# Patient Record
Sex: MALE | Race: WHITE | NOT HISPANIC OR LATINO | Employment: OTHER | URBAN - NONMETROPOLITAN AREA
[De-identification: names, ages, dates, MRNs, and addresses within clinical notes are randomized per-mention and may not be internally consistent; named-entity substitution may affect disease eponyms.]

---

## 2017-03-28 RX ORDER — CARVEDILOL 12.5 MG/1
TABLET ORAL
Qty: 60 TABLET | Refills: 4 | Status: SHIPPED | OUTPATIENT
Start: 2017-03-28 | End: 2017-08-28 | Stop reason: SDUPTHER

## 2017-05-24 RX ORDER — AMLODIPINE BESYLATE 5 MG/1
TABLET ORAL
Qty: 90 TABLET | Refills: 3 | OUTPATIENT
Start: 2017-05-24

## 2017-05-26 RX ORDER — AMLODIPINE BESYLATE 5 MG/1
TABLET ORAL
Qty: 90 TABLET | Refills: 3 | OUTPATIENT
Start: 2017-05-26

## 2017-05-31 RX ORDER — AMLODIPINE BESYLATE 5 MG/1
5 TABLET ORAL DAILY
Qty: 30 TABLET | Refills: 0 | Status: SHIPPED | OUTPATIENT
Start: 2017-05-31 | End: 2017-07-15 | Stop reason: SDUPTHER

## 2017-06-20 RX ORDER — LISINOPRIL AND HYDROCHLOROTHIAZIDE 20; 12.5 MG/1; MG/1
TABLET ORAL
Qty: 180 TABLET | Refills: 3 | Status: SHIPPED | OUTPATIENT
Start: 2017-06-20

## 2017-07-17 RX ORDER — AMLODIPINE BESYLATE 5 MG/1
TABLET ORAL
Qty: 30 TABLET | Refills: 0 | Status: SHIPPED | OUTPATIENT
Start: 2017-07-17 | End: 2017-08-22 | Stop reason: SDUPTHER

## 2017-08-23 RX ORDER — AMLODIPINE BESYLATE 5 MG/1
TABLET ORAL
Qty: 30 TABLET | Refills: 0 | Status: SHIPPED | OUTPATIENT
Start: 2017-08-23 | End: 2017-08-28 | Stop reason: SDUPTHER

## 2017-08-28 ENCOUNTER — OFFICE VISIT (OUTPATIENT)
Dept: FAMILY MEDICINE CLINIC | Facility: CLINIC | Age: 67
End: 2017-08-28

## 2017-08-28 VITALS
WEIGHT: 234 LBS | BODY MASS INDEX: 31.69 KG/M2 | SYSTOLIC BLOOD PRESSURE: 130 MMHG | OXYGEN SATURATION: 97 % | HEART RATE: 54 BPM | HEIGHT: 72 IN | DIASTOLIC BLOOD PRESSURE: 80 MMHG

## 2017-08-28 DIAGNOSIS — I10 ESSENTIAL HYPERTENSION: Primary | ICD-10-CM

## 2017-08-28 DIAGNOSIS — E78.5 HYPERLIPIDEMIA, UNSPECIFIED HYPERLIPIDEMIA TYPE: ICD-10-CM

## 2017-08-28 DIAGNOSIS — S29.9XXS CHEST WALL TRAUMA, SEQUELA: ICD-10-CM

## 2017-08-28 DIAGNOSIS — E11.9 TYPE 2 DIABETES MELLITUS WITHOUT COMPLICATION, WITHOUT LONG-TERM CURRENT USE OF INSULIN (HCC): ICD-10-CM

## 2017-08-28 LAB — HBA1C MFR BLD: 6.3 %

## 2017-08-28 PROCEDURE — 83036 HEMOGLOBIN GLYCOSYLATED A1C: CPT | Performed by: FAMILY MEDICINE

## 2017-08-28 PROCEDURE — 99214 OFFICE O/P EST MOD 30 MIN: CPT | Performed by: FAMILY MEDICINE

## 2017-08-28 RX ORDER — CARVEDILOL 12.5 MG/1
12.5 TABLET ORAL 2 TIMES DAILY WITH MEALS
Qty: 60 TABLET | Refills: 12 | Status: SHIPPED | OUTPATIENT
Start: 2017-08-28

## 2017-08-28 RX ORDER — AMLODIPINE BESYLATE 5 MG/1
5 TABLET ORAL DAILY
Qty: 30 TABLET | Refills: 12 | Status: SHIPPED | OUTPATIENT
Start: 2017-08-28

## 2017-08-28 NOTE — PROGRESS NOTES
Subjective   Ace Meng is a 66 y.o. male.     History of Present Illness   66-year-old male.  History of hyperlipidemia, hypertension, diabetes.    Regarding diabetes.  Through diet exercise and weight loss he was able to lower his A1c into a pre-diabetes level.  He has not always been compliant with follow-ups.  Is here to check another A1c.    Regarding hypertension, medicines are reviewed.  He needs some renewals.    Diarrhea.  In the last year has seen Dr. Nicole.  He's had a colonoscopy which apparently showed some microscopic colitis.  He's had 2 EGDs.  EGDs are on the chart but not the colonoscopy.  Has been taking Lomotil daily but still has some trouble with loose stool.    In June, trauma and ended up in the emergency room.  He suspected broken ribs but the workup did not reveal this.  It did reveal some fatty liver and he has some questions about that.  The following portions of the patient's history were reviewed and updated as appropriate: allergies, current medications, past family history, past medical history, past social history, past surgical history and problem list.    Review of Systems   Constitutional: Negative for fever and unexpected weight change.   HENT: Negative for congestion.    Respiratory: Negative for cough and shortness of breath.    Gastrointestinal: Positive for diarrhea. Negative for blood in stool.       Objective   Physical Exam   Constitutional: He is oriented to person, place, and time. He appears well-nourished.   HENT:   Head: Normocephalic.   Right Ear: External ear normal.   Left Ear: External ear normal.   Eyes: EOM are normal.   Cardiovascular: Normal rate and regular rhythm.    Pulmonary/Chest: Effort normal and breath sounds normal.   Neurological: He is alert and oriented to person, place, and time.   Psychiatric: He has a normal mood and affect.       Assessment/Plan   Diagnoses and all orders for this visit:    Essential hypertension  -     amLODIPine  (NORVASC) 5 MG tablet; Take 1 tablet by mouth Daily.  -     carvedilol (COREG) 12.5 MG tablet; Take 1 tablet by mouth 2 (Two) Times a Day With Meals.    Type 2 diabetes mellitus without complication, without long-term current use of insulin  -     POC Glycosylated Hemoglobin (Hb A1C)    Hyperlipidemia, unspecified hyperlipidemia type    Chest wall trauma, sequela    Hypertension.  Refills given.  Pressure looks fine today.  I noticed that his heart rate was a little slow but apparently it hasn't been at home.  He will monitor his heart rate.  Can certainly reduce the dose of Coreg in the future.    Diabetes.  A1c is 6.3.  In the past, he was on problems statin but really stopped because he doesn't want to be on it.  He does take an ACE inhibitor.  After general discussion, no other meds for this.  I strongly encouraged him to come in for a Medicare wellness visit or we can look at things more extensively.    History of hyperlipidemia.  Needs to be reevaluated    Chest wall trauma.  I have reviewed records from June where he was in the emergency room.  Extensive workup.  This is now resolved.  We discussed the reports that said he had fatty liver and what it meant.  Advised him to continue diet exercise keep his weight down.  A granuloma that appears benign was also seen.

## 2018-07-11 ENCOUNTER — TRANSCRIBE ORDERS (OUTPATIENT)
Dept: PAIN MEDICINE | Facility: CLINIC | Age: 68
End: 2018-07-11

## 2018-07-11 DIAGNOSIS — M54.41 ACUTE LEFT-SIDED LOW BACK PAIN WITH RIGHT-SIDED SCIATICA: Primary | ICD-10-CM

## 2018-07-30 ENCOUNTER — TELEPHONE (OUTPATIENT)
Dept: PAIN MEDICINE | Facility: CLINIC | Age: 68
End: 2018-07-30

## 2018-07-30 NOTE — TELEPHONE ENCOUNTER
Brian Report #[id number]   MRI of the lumbar spine 07/06/2018, revealed conus ends at T12. Diffuse degenerative changes.  Disc levels:  L1-L2: small disc bulge without canal or NF stenosis  L2-L3: small disc bulge with mild articular facet disease. Mild NF stenosis. Central canal is patent  L3-L4: small disc bulge with mild articular facet disease. Moderate to severe left and moderate right NF stenosis. Mild central canal stenosis  L4-L5: Moderate articular facet disease with mild ligamentum flavum hypertrophy. Fluid in the left facet joint. Small disc protrusion. Mild to moderate central canal stenosis. Moderate left NF stenosis  L5-S1: Small disc bulge. Moderate articular facet disease. Central canal is patent. Mild to moderate NF stenosis.

## 2018-07-30 NOTE — PROGRESS NOTES
"Chief Complaint: \"Pain in my lower back and left leg.\"     History of Present Illness:   Patient: Mr. Ace Meng, 67 y.o. male   Referring physician: Dr. Warner Swann   Reason for referral: Consultation for intractable chronic low back and left leg pain.   Pain history: Patient reports a 50-year history of lower back pain, which began without incident. Pain has progressed in intensity over the past 1 month.   Pain description: constant lower back pain with intermittent exacerbation, described as aching and throbbing sensation.   Radiation of pain: The lower back pain radiates into the posterior aspect of the left hip and anterior aspect of the left thigh.  Pain used to radiate all the way down to the dorsum of his left foot.  Pain has centralized after starting physical therapy  Pain intensity today: 2/10  Average pain intensity last week: 5/10  Pain intensity ranges from: 2-3/10 to 10/10  Aggravating factors: Pain increases with lying down, standing for a few minutes and walking more than 100 yards  Alleviating factors: Pain decreases with sitting down and changing positions    Associated symptoms:   Patient reports pain numbness and weakness in the left lower extremity.  Patient denies right-sided symptoms.  Patient denies any new bladder or bowel problems.   Patient denies difficulties with his balance or recent falls.      Review of previous therapies and additional medical records:  Ace Meng has already failed the following measures, including:   Conservative measures: analgesics, chiropractic therapy, physical therapy   Interventional measures: None  Surgical measures: No history of lumbar spine surgery   Ace Meng presents with significant comorbidities, particularly diabetes type 2, HTN, CAD.   In terms of current analgesics, Ace Meng takes: gabapentin and trazodone  I have reviewed Brian Report #[id number] consistent to medication reconciliation.     Global Pain Scale  07-31-18    "              Pain  9                 Feelings  3                 Clinical outcomes  18                 Activities 14                 GPS Total:  44                    Review of Diagnostic Studies:    MRI of the lumbar spine 07/06/2018, revealed conus ends at T12. Diffuse degenerative changes.  Disc levels:  L1-L2: small disc bulge without canal or NF stenosis  L2-L3: small disc bulge with mild articular facet disease. Mild NF stenosis. Central canal is patent  L3-L4: small disc bulge with mild articular facet disease. Moderate to severe left and moderate right NF stenosis. Mild central canal stenosis  L4-L5: Moderate articular facet disease with mild ligamentum flavum hypertrophy. Fluid in the left facet joint. Small disc protrusion. Mild to moderate central canal stenosis. Moderate left NF stenosis  L5-S1: Small disc bulge. Moderate articular facet disease. Central canal is patent. Mild to moderate NF stenosis.    Review of Systems   Musculoskeletal: Positive for arthralgias, back pain, gait problem, joint swelling and myalgias.   All other systems reviewed and are negative.        Patient Active Problem List   Diagnosis   • Hypertension   • Hyperlipidemia   • Diarrhea   • Diabetes mellitus (CMS/HCC)   • Colitis   • Acute coronary syndrome (CMS/HCC)   • Mild obesity   • Neuroforaminal stenosis of lumbar spine   • DDD (degenerative disc disease), lumbar       Past Medical History:   Diagnosis Date   • Abdominal pain, generalized    • Acute coronary syndrome (CMS/HCC)    • Allergic rhinitis    • Chronic low back pain    • Colitis    • Diabetes mellitus (CMS/HCC)    • Diarrhea    • Family history of colon cancer    • H/O colonoscopy 04/05/2016    Microscopic colitis   • Hyperlipidemia    • Hypertension          Past Surgical History:   Procedure Laterality Date   • COLONOSCOPY  07/2016    Dx with colitis         Family History   Problem Relation Age of Onset   • Cancer Father         colon         Social History  "    Social History   • Marital status:      Social History Main Topics   • Smoking status: Never Smoker   • Drug use: Unknown     Other Topics Concern   • Not on file        Current Outpatient Prescriptions:   •  amLODIPine (NORVASC) 5 MG tablet, Take 1 tablet by mouth Daily., Disp: 30 tablet, Rfl: 12  •  carvedilol (COREG) 12.5 MG tablet, Take 1 tablet by mouth 2 (Two) Times a Day With Meals., Disp: 60 tablet, Rfl: 12  •  cholecalciferol (VITAMIN D3) 400 units tablet, Take 400 Units by mouth Daily., Disp: , Rfl:   •  diphenoxylate-atropine (LOMOTIL) 2.5-0.025 MG per tablet, Take 1 tablet by mouth 4 (Four) Times a Day As Needed for Diarrhea., Disp: , Rfl:   •  gabapentin (NEURONTIN) 100 MG capsule, Take 100 mg by mouth 3 (Three) Times a Day., Disp: , Rfl:   •  glimepiride (AMARYL) 1 MG tablet, Take 1 mg by mouth Every Morning Before Breakfast., Disp: , Rfl:   •  glucose blood (FREESTYLE LITE) test strip, 1 each by Other route 2 (Two) Times a Week. Use as instructed, Disp: 100 each, Rfl: 0  •  lisinopril-hydrochlorothiazide (PRINZIDE,ZESTORETIC) 20-12.5 MG per tablet, TAKE ONE TABLET BY MOUTH TWICE A DAY (Patient taking differently: TAKE ONE TABLET BY MOUTH daily), Disp: 180 tablet, Rfl: 3  •  OMEGA-3 FATTY ACIDS PO, Take  by mouth., Disp: , Rfl:   •  traZODone (DESYREL) 50 MG tablet, Take 50 mg by mouth Every Night., Disp: , Rfl:       No Known Allergies      /62   Pulse 62   Temp 97.6 °F (36.4 °C) (Temporal Artery )   Resp 18   Ht 182.9 cm (72\")   Wt 107 kg (236 lb 12.8 oz)   SpO2 97%   BMI 32.12 kg/m²       Physical Exam:  Constitutional: Patient is oriented to person, place, and time. Vital signs are normal. Patient appears well-developed and well-nourished.   HEENT: Head: Normocephalic and atraumatic. Eyes: Conjunctivae and lids are normal. Pupils: Equal, round, reactive to light.   Neck: Trachea normal. Neck supple. No JVD present.   Pulmonary Respiratory effort: No increased work of " breathing or signs of respiratory distress. Auscultation of lungs: Clear to auscultation.   Cardiovascular Auscultation of heart: Normal rate and rhythm, normal S1 and S2, no murmurs.   Peripheral vascular exam: Femoral: right 2+, left 2+.  Abdomen: The abdomen was soft and nontender. Bowel sounds were normal.   Musculoskeletal   Gait and station: Gait evaluation demonstrated a normal gait   Lumbar spine: The range of motion of the lumbar spine is full and without pain. Extension, flexion, lateral flexion, rotation of the lumbar spine did not increase or reproduce pain. Lumbar facet joint loading maneuvers are negative.   Jason and Gaenslen's tests are negative   Piriformis maneuvers are negative   Palpation of the bilateral ischial tuberosities, unrevealing   Palpation of the bilateral greater trochanter, unrevealing   Examination of the Iliotibial band: unrevealing   Hip joints: The range of motion of the hip joints is full and without pain   Neurological: Patient is alert and oriented to person, place, and time. Speech: speech is normal. Cortical function: Normal mental status.   Cranial nerves: Cranial nerves 2-12 intact.   Reflex Scores:  Right patellar: 1+  Left patellar: 1+  Right achilles: 0+  Left achilles: 0+  Motor strength: 5/5  Motor Tone: normal tone.   Involuntary movements: none.   Superficial/Primitive Reflexes: primitive reflexes were absent.   Right Dutta: absent  Left Dutta: absent  Right ankle clonus: absent  Left ankle clonus: absent   Negative long tract signs. Straight leg raising test is negative. Femoral stretch sign is negative.   Sensation: No sensory loss. Sensory exam: intact to light touch, intact pain and temperature sensation, intact vibration sensation and normal proprioception.   Coordination: Normal finger to nose and heel to shin. Normal balance and negative. Romberg's sign negative.   Skin and subcutaneous tissue: Skin is warm and intact. No rash noted. No cyanosis.    Psychiatric: Judgment and insight: Normal. Orientation to person, place and time: Normal. Recent and remote memory: Intact. Mood and affect: Normal.     ASSESSMENT:   1. Neuroforaminal stenosis of lumbar spine    2. DDD (degenerative disc disease), lumbar    3. Type 2 diabetes mellitus without complication, without long-term current use of insulin (CMS/[facility])    4. Mild obesity      PLAN/MEDICAL DECISION MAKING: I had a lengthy conversation with . Ace Meng regarding his chronic pain condition and potential therapeutic options including risks, benefits, alternative therapies, to name a few. Patient Presents with a long-standing history of lower back pain, which caused significantly worse a few months back.  At that point, patient started experiencing severe left lower back pain radiating onto the left lower extremity all the way down onto his left foot.  After starting physical therapy, pain has centralized. MRI of the lumbar spine 07/06/2018, revealed diffuse degenerative disc and facet joint changes.  At L3-L4: disc bulge with mild articular facet disease. Moderate to severe left and moderate right NF stenosis. Mild central canal stenosis. L4-L5: Moderate articular facet disease with mild ligamentum flavum hypertrophy. Fluid in the left facet joint. Small disc protrusion. Mild to moderate central canal stenosis. Moderate left NF stenosis. At L5-S1: Small disc bulge. Moderate articular facet disease. Central canal is patent. Mild to moderate NF stenosis. Patient has failed to obtain pain relief with conservative measures, as referenced above. I have reviewed all available patient's medical records as well as previous therapies as referenced above. Therefore, I have proposed the following plan:  1. Pharmacological measures: Reviewed. Discussed. Patient takes gabapentin and trazodone  2. Interventional pain management measures: Patient will be scheduled for diagnostic and therapeutic left L3-L4 and left L4-L5  transforaminal epidural steroid injections. We may repeat another epidural depending on patient's outcome. Otherwise, we will obtain CT myelogram and facilitate neurosurgical consultation  3. Long-term rehabilitation efforts:  A. The patient does not have a history of falls. I did complete a risk assessment for falls.   B. Patient will start a comprehensive physical therapy program for water therapy, core strengthening, gait and balance training, neurodynamics, myofascial release, cupping and dry needling   C. Start an exercise program such as water therapy and daily walks for fitness  D. Contrast therapy: Apply ice-packs for 15-20 minutes, followed by heating pads for 15-20 minutes to affected area   4. The patient and his family have been instructed to contact my office with any questions or difficulties. The patient understands the plan and agrees to proceed accordingly.       Patient Care Team:  Warner Swann MD as PCP - General  Cal Nicole MD as PCP - Claims Attributed  Cal Nicole MD as Consulting Physician (Gastroenterology)  Anthony Vaughn MD as Consulting Physician (Cardiology)  Zachary Vilchis MD as Consulting Physician (Pain Medicine)  Gumaro Landers MD as Consulting Physician (Dermatology)  ALEJANDRINA Gomez as Nurse Practitioner (Internal Medicine)  ALLI Bowling DPM as Consulting Physician (Podiatry)     No orders of the defined types were placed in this encounter.        No future appointments.      Zachary Vilchis MD     EMR Dragon/Transcription disclaimer:  Much of this encounter note is an electronic transcription of spoken language to printed text. Electronic transcription of spoken language may permit erroneous, or at times, nonsensical words or phrases to be inadvertently transcribed. Although I have reviewed the note for such errors, some may still exist.

## 2018-07-31 ENCOUNTER — OFFICE VISIT (OUTPATIENT)
Dept: PAIN MEDICINE | Facility: CLINIC | Age: 68
End: 2018-07-31

## 2018-07-31 VITALS
WEIGHT: 236.8 LBS | BODY MASS INDEX: 32.07 KG/M2 | SYSTOLIC BLOOD PRESSURE: 122 MMHG | HEIGHT: 72 IN | HEART RATE: 62 BPM | OXYGEN SATURATION: 97 % | DIASTOLIC BLOOD PRESSURE: 62 MMHG | TEMPERATURE: 97.6 F | RESPIRATION RATE: 18 BRPM

## 2018-07-31 DIAGNOSIS — M51.36 DDD (DEGENERATIVE DISC DISEASE), LUMBAR: ICD-10-CM

## 2018-07-31 DIAGNOSIS — E66.9 MILD OBESITY: ICD-10-CM

## 2018-07-31 DIAGNOSIS — M48.061 NEUROFORAMINAL STENOSIS OF LUMBAR SPINE: ICD-10-CM

## 2018-07-31 DIAGNOSIS — E11.9 TYPE 2 DIABETES MELLITUS WITHOUT COMPLICATION, WITHOUT LONG-TERM CURRENT USE OF INSULIN (HCC): ICD-10-CM

## 2018-07-31 PROBLEM — M51.369 DDD (DEGENERATIVE DISC DISEASE), LUMBAR: Status: ACTIVE | Noted: 2018-07-31

## 2018-07-31 PROCEDURE — 99203 OFFICE O/P NEW LOW 30 MIN: CPT | Performed by: ANESTHESIOLOGY

## 2018-07-31 RX ORDER — OMEGA-3S/DHA/EPA/FISH OIL/D3 300MG-1000
400 CAPSULE ORAL DAILY
COMMUNITY
End: 2019-08-29

## 2018-07-31 RX ORDER — DIPHENOXYLATE HYDROCHLORIDE AND ATROPINE SULFATE 2.5; .025 MG/1; MG/1
1 TABLET ORAL 4 TIMES DAILY PRN
COMMUNITY
End: 2019-12-05

## 2018-07-31 RX ORDER — GLIMEPIRIDE 1 MG/1
1 TABLET ORAL
COMMUNITY

## 2018-07-31 RX ORDER — TRAZODONE HYDROCHLORIDE 50 MG/1
50 TABLET ORAL NIGHTLY
COMMUNITY
End: 2019-08-29

## 2018-07-31 RX ORDER — GABAPENTIN 100 MG/1
100 CAPSULE ORAL 3 TIMES DAILY
COMMUNITY
End: 2018-08-06 | Stop reason: SDUPTHER

## 2018-08-01 ENCOUNTER — OUTSIDE FACILITY SERVICE (OUTPATIENT)
Dept: PAIN MEDICINE | Facility: CLINIC | Age: 68
End: 2018-08-01

## 2018-08-01 PROCEDURE — 64483 NJX AA&/STRD TFRM EPI L/S 1: CPT | Performed by: ANESTHESIOLOGY

## 2018-08-01 PROCEDURE — 64484 NJX AA&/STRD TFRM EPI L/S EA: CPT | Performed by: ANESTHESIOLOGY

## 2018-08-01 PROCEDURE — 99152 MOD SED SAME PHYS/QHP 5/>YRS: CPT | Performed by: ANESTHESIOLOGY

## 2018-08-02 ENCOUNTER — TELEPHONE (OUTPATIENT)
Dept: PAIN MEDICINE | Facility: CLINIC | Age: 68
End: 2018-08-02

## 2018-08-02 NOTE — TELEPHONE ENCOUNTER
Spoke with Physical Therapy, Alan. Patient has been making progress and they wanted to verify that we did not have any new findings after patient procedure. Went over MRI results and advised that patient had TESI yesterday. No further needs expressed.

## 2018-08-02 NOTE — TELEPHONE ENCOUNTER
Patient had dx/tx left L3-L4 and left L4-L5 TFESI. Patient reports he is doing great. No pain and no soreness

## 2018-08-06 RX ORDER — GABAPENTIN 100 MG/1
CAPSULE ORAL
Qty: 180 CAPSULE | Refills: 5 | OUTPATIENT
Start: 2018-08-06 | End: 2019-08-29

## 2018-08-06 NOTE — TELEPHONE ENCOUNTER
Patient called and stated that when he had injection last week he had 100% relief for a couple of days. Some of the pain, mainly in the legs has returned since but not as severe as before. The pain is worse at night and in the mornings and he is having difficulty sleeping. He was taking Gabapentin 100 mg tid by Dr. King but then they increased to 200 mg tid. He is currently out of the Gabapentin and wanted to know if you would put him back on it or is there anything else he can do? He is doing physical therapy as well.     Per Dr. Vilchis: Ok to refill 200 TID, or if he tolerated 200 ok, we can give him 300 mg TID     Spoke with patient and he would like to do 200 mg tid    Rx called into [facility]

## 2019-08-28 ENCOUNTER — TELEPHONE (OUTPATIENT)
Dept: GASTROENTEROLOGY | Facility: CLINIC | Age: 69
End: 2019-08-28

## 2019-08-29 ENCOUNTER — OFFICE VISIT (OUTPATIENT)
Dept: GASTROENTEROLOGY | Facility: CLINIC | Age: 69
End: 2019-08-29

## 2019-08-29 VITALS
HEIGHT: 72 IN | TEMPERATURE: 97.1 F | SYSTOLIC BLOOD PRESSURE: 130 MMHG | DIASTOLIC BLOOD PRESSURE: 70 MMHG | BODY MASS INDEX: 32.32 KG/M2 | WEIGHT: 238.6 LBS | OXYGEN SATURATION: 98 % | HEART RATE: 52 BPM

## 2019-08-29 DIAGNOSIS — R19.7 DIARRHEA, UNSPECIFIED TYPE: ICD-10-CM

## 2019-08-29 DIAGNOSIS — K58.0 IRRITABLE BOWEL SYNDROME WITH DIARRHEA: ICD-10-CM

## 2019-08-29 DIAGNOSIS — K52.839 MICROSCOPIC COLITIS, UNSPECIFIED MICROSCOPIC COLITIS TYPE: Primary | ICD-10-CM

## 2019-08-29 PROCEDURE — 99204 OFFICE O/P NEW MOD 45 MIN: CPT | Performed by: INTERNAL MEDICINE

## 2019-08-29 RX ORDER — ATORVASTATIN CALCIUM 20 MG/1
20 TABLET, FILM COATED ORAL DAILY
COMMUNITY

## 2019-08-29 NOTE — PROGRESS NOTES
PCP: Warner Swann MD    Chief Complaint   Patient presents with   • GI Problem     Colitis       History of Present Illness:   HPI   Mr. Meng is a 68-year-old with a history of acute coronary syndrome, diabetes and hyperlipidemia.  The patient over the last several years has been followed by Dr. Nicole in [place], [place].  He was diagnosed with microscopic colitis in 2016 and has been on medical management.  Mr. Meng has taken Entocort, Uceris, Imodium and Lialda.  The main issue with Entocort and Uceris was the cost of the medication.  There was improvement of the diarrhea while on medical therapy.  He denies any localized abdominal pain but can develop some constipation after taking Lomotil.  He states that in general there are no nocturnal episodes of diarrhea.  The patient denies any blood in the stool.  Mr. Meng does admit to some urgency to have a bowel movement at times after meals.  There is no history of unexplained weight loss.  He has a good appetite overall.  There is no history of nausea or vomiting.  Past Medical History:   Diagnosis Date   • Abdominal pain, generalized    • Acute coronary syndrome (CMS/HCC)    • Allergic rhinitis    • Chronic low back pain    • Colitis    • Diabetes mellitus (CMS/HCC)    • Diarrhea    • Family history of colon cancer    • H/O colonoscopy 04/05/2016    Microscopic colitis   • Hyperlipidemia    • Hypertension        Past Surgical History:   Procedure Laterality Date   • COLONOSCOPY  07/2016    Dx with colitis         Current Outpatient Medications:   •  amLODIPine (NORVASC) 5 MG tablet, Take 1 tablet by mouth Daily. (Patient taking differently: Take 10 mg by mouth Daily.), Disp: 30 tablet, Rfl: 12  •  atorvastatin (LIPITOR) 20 MG tablet, Take 20 mg by mouth Daily., Disp: , Rfl:   •  carvedilol (COREG) 12.5 MG tablet, Take 1 tablet by mouth 2 (Two) Times a Day With Meals. (Patient taking differently: Take 6.25 mg by mouth 2 (Two) Times a Day With  Meals.), Disp: 60 tablet, Rfl: 12  •  diphenoxylate-atropine (LOMOTIL) 2.5-0.025 MG per tablet, Take 1 tablet by mouth 4 (Four) Times a Day As Needed for Diarrhea., Disp: , Rfl:   •  glimepiride (AMARYL) 1 MG tablet, Take 1 mg by mouth Every Morning Before Breakfast., Disp: , Rfl:   •  glucose blood (FREESTYLE LITE) test strip, 1 each by Other route 2 (Two) Times a Week. Use as instructed, Disp: 100 each, Rfl: 0  •  lisinopril-hydrochlorothiazide (PRINZIDE,ZESTORETIC) 20-12.5 MG per tablet, TAKE ONE TABLET BY MOUTH TWICE A DAY (Patient taking differently: TAKE ONE TABLET BY MOUTH TWICE DAY), Disp: 180 tablet, Rfl: 3    No Known Allergies    Family History   Problem Relation Age of Onset   • Cancer Father         colon       Social History     Socioeconomic History   • Marital status:      Spouse name: Not on file   • Number of children: Not on file   • Years of education: Not on file   • Highest education level: Not on file   Tobacco Use   • Smoking status: Never Smoker       Review of Systems   Constitutional: Positive for fatigue and unexpected weight change (LOSS). Negative for activity change, appetite change and fever.   HENT: Negative for dental problem, hearing loss, mouth sores, postnasal drip, sneezing, trouble swallowing and voice change.    Eyes: Negative for pain, redness, itching and visual disturbance.   Respiratory: Negative for cough, choking, chest tightness, shortness of breath and wheezing.    Cardiovascular: Negative for chest pain, palpitations and leg swelling.   Gastrointestinal: Positive for abdominal distention, abdominal pain and diarrhea. Negative for anal bleeding, blood in stool, constipation, nausea, rectal pain and vomiting.   Endocrine: Negative for cold intolerance, heat intolerance, polydipsia, polyphagia and polyuria.   Genitourinary: Negative.  Negative for dysuria, enuresis, flank pain, hematuria and urgency.   Musculoskeletal: Negative for arthralgias, back pain, gait  problem, joint swelling and myalgias.   Skin: Negative for color change, pallor and rash.   Allergic/Immunologic: Negative for environmental allergies, food allergies and immunocompromised state.   Neurological: Negative for dizziness, tremors, seizures, facial asymmetry, speech difficulty, numbness and headaches.   Hematological: Negative for adenopathy.   Psychiatric/Behavioral: Negative for behavioral problems, confusion, dysphoric mood, hallucinations and self-injury.       Vitals:    08/29/19 0913   BP: 130/70   Pulse: 52   Temp: 97.1 °F (36.2 °C)   SpO2: 98%       Physical Exam   Constitutional: He is oriented to person, place, and time. He appears well-nourished. No distress.   HENT:   Head: Atraumatic.   Mouth/Throat: Oropharynx is clear and moist. No oropharyngeal exudate.   Eyes: EOM are normal. No scleral icterus.   Neck: Neck supple. No thyromegaly present.   Cardiovascular: Normal rate, regular rhythm and normal heart sounds. Exam reveals no gallop.   No murmur heard.  Pulmonary/Chest: Effort normal and breath sounds normal. He has no wheezes. He has no rales.   Abdominal: Soft. Bowel sounds are normal. There is no tenderness. There is no guarding.   Diastases recti   Musculoskeletal: Normal range of motion. He exhibits no edema or tenderness.   Lymphadenopathy:     He has no cervical adenopathy.   Neurological: He is alert and oriented to person, place, and time. He exhibits normal muscle tone.   Skin: Skin is dry. No erythema.   Psychiatric: He has a normal mood and affect. His behavior is normal. Thought content normal.   Vitals reviewed.      Ace was seen today for gi problem.    Diagnoses and all orders for this visit:    Microscopic colitis, unspecified microscopic colitis type    Diarrhea, unspecified type  -     rifaximin (XIFAXAN) 550 MG tablet; Take 1 tablet by mouth Every 8 (Eight) Hours.    Irritable bowel syndrome with diarrhea    The patient has biopsy-proven microscopic colitis upon  review of the records.  He had a small bowel biopsy that was negative for celiac disease.  There is an element of IBS diarrhea given the clinical history.  Overall the patient does well with Entocort but the major issue is the cost.      Plan: Will prescribe Xifaxan 550 mg 1 by mouth 3 times a day for 14 days.           Also discussed the use of Pepto-Bismol tablets 2 by mouth 3 times a day.  The patient was informed that Pepto-Bismol tablets can make the stool black.           Discussed stopping the Lomotil medication given the potential adverse effects.           Will follow-up in the office in 3 months.

## 2019-08-30 ENCOUNTER — TELEPHONE (OUTPATIENT)
Dept: GASTROENTEROLOGY | Facility: CLINIC | Age: 69
End: 2019-08-30

## 2019-08-30 NOTE — TELEPHONE ENCOUNTER
SPOKE WITH PATIENT'S WIFE. INFORMED HER THAT THE XIFAXAN PA HAS BEEN SENT THIS AFTERNOON. IT NORMALLY TAKE UP TO 48 -72 HOURS TO HEAR BACK FROM INSURANCE. WIFE VOICED UNDERSTANDING.

## 2019-09-03 ENCOUNTER — TELEPHONE (OUTPATIENT)
Dept: GASTROENTEROLOGY | Facility: CLINIC | Age: 69
End: 2019-09-03

## 2019-09-03 NOTE — TELEPHONE ENCOUNTER
Spouse called [facility] Friday 8/30/19 regarding medication that has a very high co-pay. Spouse would like a return call regarding an alternative medication, if available. Route to [facility] to consult with Dr. Jacome for review.

## 2019-09-04 NOTE — TELEPHONE ENCOUNTER
Called patient's wife back. No answer; left voice message. Xifaxan 550 mg tid x 14 days ready for .

## 2019-10-04 ENCOUNTER — TELEPHONE (OUTPATIENT)
Dept: GASTROENTEROLOGY | Facility: CLINIC | Age: 69
End: 2019-10-04

## 2019-10-04 NOTE — TELEPHONE ENCOUNTER
I talked to patient this morning. He stated he went a head and paid for the Xifaxan 550 mg TiD. He stated it seems to be working pretty good. He is still taking the Pepto Bismol. Thanks

## 2019-10-15 ENCOUNTER — TELEPHONE (OUTPATIENT)
Dept: GASTROENTEROLOGY | Facility: CLINIC | Age: 69
End: 2019-10-15

## 2019-10-15 NOTE — TELEPHONE ENCOUNTER
Spouse called in regards to BM issues and would like to know if it is possible to increase his pepto.; advised Clarissa I would consult with physician and return call as soon as possible. Spouse confirmed understanding.

## 2019-10-17 ENCOUNTER — TELEPHONE (OUTPATIENT)
Dept: GASTROENTEROLOGY | Facility: CLINIC | Age: 69
End: 2019-10-17

## 2019-10-17 NOTE — TELEPHONE ENCOUNTER
Spouse returned called; advised of Kasia response to the Kaopectate; advised dosage would be directions on back of bottle per Mario. Spouse voiced understanding.

## 2019-10-17 NOTE — TELEPHONE ENCOUNTER
Kasia,  Patient wife called. She stated that patient is having a lot of diarrhea; something incontinence. She wants to know if patient can take the Pepto with Kaopectate or the Kaopectate in place of the Pepto. Please advise. Dr Jacome's patient. Thanks

## 2019-10-17 NOTE — TELEPHONE ENCOUNTER
darrel  According to samanta's last note, pt can take Pepto-Bismol tablets 2 by mouth 3 times a day.  Let pt know that it can make the stool black.  She can substitute w/ Kaopectate

## 2019-12-05 ENCOUNTER — OFFICE VISIT (OUTPATIENT)
Dept: GASTROENTEROLOGY | Facility: CLINIC | Age: 69
End: 2019-12-05

## 2019-12-05 VITALS
HEIGHT: 72 IN | HEART RATE: 56 BPM | OXYGEN SATURATION: 97 % | BODY MASS INDEX: 33.59 KG/M2 | WEIGHT: 248 LBS | TEMPERATURE: 98.1 F | SYSTOLIC BLOOD PRESSURE: 152 MMHG | DIASTOLIC BLOOD PRESSURE: 78 MMHG

## 2019-12-05 DIAGNOSIS — K52.839 MICROSCOPIC COLITIS, UNSPECIFIED MICROSCOPIC COLITIS TYPE: Primary | ICD-10-CM

## 2019-12-05 PROCEDURE — 99214 OFFICE O/P EST MOD 30 MIN: CPT | Performed by: INTERNAL MEDICINE

## 2019-12-05 NOTE — PROGRESS NOTES
PCP: Warner Swann MD    Chief Complaint   Patient presents with   • Follow-up     Colitis       History of Present Illness:   HPI  Mr. Meng returns to the office for follow-up.  The patient did notice some improvement with regard to the diarrhea after taking the Xifaxan medication.  He also will take Pepto-Bismol tablets at times and this is giving him some relief from the diarrhea.  The patient will generally have several good days without diarrhea.  The patient denies any localized abdominal pain.  There is no history of nausea or vomiting.  Mr. Meng denies any weight loss.  There is a history of night sweats or fever.  Past Medical History:   Diagnosis Date   • Abdominal pain, generalized    • Acute coronary syndrome (CMS/HCC)    • Allergic rhinitis    • Chronic low back pain    • Colitis    • Diabetes mellitus (CMS/HCC)    • Diarrhea    • Family history of colon cancer    • H/O colonoscopy 04/05/2016    Microscopic colitis   • Hyperlipidemia    • Hypertension    • Microscopic colitis        Past Surgical History:   Procedure Laterality Date   • COLONOSCOPY  07/2016    Dx with colitis         Current Outpatient Medications:   •  amLODIPine (NORVASC) 5 MG tablet, Take 1 tablet by mouth Daily. (Patient taking differently: Take 10 mg by mouth Daily.), Disp: 30 tablet, Rfl: 12  •  atorvastatin (LIPITOR) 20 MG tablet, Take 20 mg by mouth Daily., Disp: , Rfl:   •  Bismuth Subsalicylate (PEPTO-BISMOL PO), Take  by mouth 4 (Four) Times a Day., Disp: , Rfl:   •  carvedilol (COREG) 12.5 MG tablet, Take 1 tablet by mouth 2 (Two) Times a Day With Meals. (Patient taking differently: Take 6.25 mg by mouth 2 (Two) Times a Day With Meals.), Disp: 60 tablet, Rfl: 12  •  glimepiride (AMARYL) 1 MG tablet, Take 1 mg by mouth Every Morning Before Breakfast., Disp: , Rfl:   •  glucose blood (FREESTYLE LITE) test strip, 1 each by Other route 2 (Two) Times a Week. Use as instructed, Disp: 100 each, Rfl: 0  •   lisinopril-hydrochlorothiazide (PRINZIDE,ZESTORETIC) 20-12.5 MG per tablet, TAKE ONE TABLET BY MOUTH TWICE A DAY (Patient taking differently: TAKE ONE TABLET BY MOUTH ONCE DAY), Disp: 180 tablet, Rfl: 3    No Known Allergies    Family History   Problem Relation Age of Onset   • Cancer Father         colon       Social History     Socioeconomic History   • Marital status:      Spouse name: Not on file   • Number of children: Not on file   • Years of education: Not on file   • Highest education level: Not on file   Tobacco Use   • Smoking status: Never Smoker       Review of Systems   Constitutional: Negative for activity change, appetite change, fatigue, fever and unexpected weight change.   HENT: Negative for dental problem, hearing loss, mouth sores, postnasal drip, sneezing, trouble swallowing and voice change.    Eyes: Negative for pain, redness, itching and visual disturbance.   Respiratory: Negative for cough, choking, chest tightness, shortness of breath and wheezing.    Cardiovascular: Negative for chest pain, palpitations and leg swelling.   Gastrointestinal: Positive for constipation and diarrhea. Negative for abdominal distention, abdominal pain, anal bleeding, blood in stool, nausea, rectal pain and vomiting.   Endocrine: Negative for cold intolerance, heat intolerance, polydipsia, polyphagia and polyuria.   Genitourinary: Negative.  Negative for dysuria, enuresis, flank pain, hematuria and urgency.   Musculoskeletal: Negative for arthralgias, back pain, gait problem, joint swelling and myalgias.        Joint pain in knees and hips   Skin: Negative for color change, pallor and rash.   Allergic/Immunologic: Negative for environmental allergies, food allergies and immunocompromised state.   Neurological: Negative for dizziness, tremors, seizures, facial asymmetry, speech difficulty, numbness and headaches.   Hematological: Negative for adenopathy.   Psychiatric/Behavioral: Negative for behavioral  problems, confusion, dysphoric mood, hallucinations and self-injury.       Vitals:    12/05/19 0901   BP: 152/78   Pulse: 56   Temp: 98.1 °F (36.7 °C)   SpO2: 97%       Physical Exam   Constitutional: He is oriented to person, place, and time. He appears well-nourished.   HENT:   Head: Normocephalic and atraumatic.   Mouth/Throat: Oropharynx is clear and moist. No oropharyngeal exudate.   Eyes: EOM are normal. No scleral icterus.   Neck: Neck supple. No thyromegaly present.   Cardiovascular: Normal rate, regular rhythm and normal heart sounds. Exam reveals no gallop.   No murmur heard.  Pulmonary/Chest: Effort normal and breath sounds normal. He has no wheezes. He has no rales.   Abdominal: Soft. Bowel sounds are normal. There is no tenderness. There is no guarding.   Diastases recti   Musculoskeletal: Normal range of motion. He exhibits no edema.   Lymphadenopathy:     He has no cervical adenopathy.   Neurological: He is alert and oriented to person, place, and time. He exhibits normal muscle tone.   Skin: Skin is dry. No erythema.   Psychiatric: He has a normal mood and affect. His behavior is normal. Thought content normal.   Vitals reviewed.      Ace was seen today for follow-up.    Diagnoses and all orders for this visit:    Microscopic colitis, unspecified microscopic colitis type    The patient has known microscopic colitis.  There is also some degree of IBS diarrhea predominance.       Plan: Will begin IBgard 2 by mouth twice daily.           Continue Pepto-Bismol tablets as scheduled.           Follow-up in the office in 6 months.

## 2020-06-12 ENCOUNTER — TELEMEDICINE (OUTPATIENT)
Dept: GASTROENTEROLOGY | Facility: CLINIC | Age: 70
End: 2020-06-12

## 2020-06-12 DIAGNOSIS — K52.839 MICROSCOPIC COLITIS, UNSPECIFIED MICROSCOPIC COLITIS TYPE: Primary | ICD-10-CM

## 2020-06-12 DIAGNOSIS — K58.0 IRRITABLE BOWEL SYNDROME WITH DIARRHEA: ICD-10-CM

## 2020-06-12 PROCEDURE — 99214 OFFICE O/P EST MOD 30 MIN: CPT | Performed by: INTERNAL MEDICINE

## 2020-06-12 NOTE — PROGRESS NOTES
PCP: Warner Swann MD    No chief complaint on file.  Follow-up of diarrhea.    History of Present Illness:   HPI  This was a video visit.  The patient consented for video visit.  Mr. Meng was evaluated via telehealth.  He is doing well at this time without abdominal pain.  The patient denies any nausea.  Mr. Meng has a good appetite.  There is no history of unexplained weight loss.  He does admit to some urgency to have a bowel movement in some anxiety type situations.  Mr. Meng will generally take Pepto-Bismol a couple of times a day with good results.  There is no history of nocturnal diarrhea.  The patient denies any difficult or painful swallowing.  There is no history of breakthrough heartburn.  Past Medical History:   Diagnosis Date   • Abdominal pain, generalized    • Acute coronary syndrome (CMS/HCC)    • Allergic rhinitis    • Chronic low back pain    • Colitis    • Diabetes mellitus (CMS/HCC)    • Diarrhea    • Family history of colon cancer    • H/O colonoscopy 04/05/2016    Microscopic colitis   • Hyperlipidemia    • Hypertension    • Microscopic colitis        Past Surgical History:   Procedure Laterality Date   • COLONOSCOPY  07/2016    Dx with colitis         Current Outpatient Medications:   •  amLODIPine (NORVASC) 5 MG tablet, Take 1 tablet by mouth Daily. (Patient taking differently: Take 10 mg by mouth Daily.), Disp: 30 tablet, Rfl: 12  •  atorvastatin (LIPITOR) 20 MG tablet, Take 20 mg by mouth Daily., Disp: , Rfl:   •  Bismuth Subsalicylate (PEPTO-BISMOL PO), Take  by mouth 4 (Four) Times a Day., Disp: , Rfl:   •  carvedilol (COREG) 12.5 MG tablet, Take 1 tablet by mouth 2 (Two) Times a Day With Meals. (Patient taking differently: Take 6.25 mg by mouth 2 (Two) Times a Day With Meals.), Disp: 60 tablet, Rfl: 12  •  glimepiride (AMARYL) 1 MG tablet, Take 1 mg by mouth Every Morning Before Breakfast., Disp: , Rfl:   •  glucose blood (FREESTYLE LITE) test strip, 1 each by Other route 2  (Two) Times a Week. Use as instructed, Disp: 100 each, Rfl: 0  •  lisinopril-hydrochlorothiazide (PRINZIDE,ZESTORETIC) 20-12.5 MG per tablet, TAKE ONE TABLET BY MOUTH TWICE A DAY (Patient taking differently: TAKE ONE TABLET BY MOUTH ONCE DAY), Disp: 180 tablet, Rfl: 3    No Known Allergies    Family History   Problem Relation Age of Onset   • Cancer Father         colon       Social History     Socioeconomic History   • Marital status:      Spouse name: Not on file   • Number of children: Not on file   • Years of education: Not on file   • Highest education level: Not on file   Tobacco Use   • Smoking status: Never Smoker       Review of Systems   Constitutional: Negative for appetite change, fatigue, fever and unexpected weight change.   HENT: Negative for dental problem, mouth sores, postnasal drip, sneezing, trouble swallowing and voice change.    Eyes: Negative for pain, redness and itching.   Respiratory: Negative for cough, shortness of breath and wheezing.    Cardiovascular: Negative for chest pain, palpitations and leg swelling.   Gastrointestinal: Positive for diarrhea. Negative for abdominal distention, abdominal pain, anal bleeding, blood in stool, constipation, nausea, rectal pain and vomiting.   Endocrine: Negative for cold intolerance, heat intolerance, polydipsia and polyuria.   Genitourinary: Negative for dysuria, enuresis, flank pain, hematuria and urgency.   Musculoskeletal: Negative for arthralgias, back pain, joint swelling and myalgias.   Skin: Negative for color change, pallor and rash.   Allergic/Immunologic: Negative for environmental allergies, food allergies and immunocompromised state.   Neurological: Negative for dizziness, tremors, seizures, facial asymmetry, numbness and headaches.   Psychiatric/Behavioral: Negative for behavioral problems, dysphoric mood, hallucinations and self-injury.       There were no vitals filed for this visit.    Physical Exam   Constitutional: He is  oriented to person, place, and time. He appears well-nourished. No distress.   HENT:   Head: Atraumatic.   Mouth/Throat: Oropharynx is clear and moist.   Eyes: EOM are normal. No scleral icterus.   Abdominal: Soft.   Musculoskeletal: Normal range of motion. He exhibits no edema.   Neurological: He is alert and oriented to person, place, and time. He exhibits normal muscle tone.   Skin: Skin is dry. No erythema.   Psychiatric: He has a normal mood and affect. His behavior is normal. Thought content normal.   Vitals reviewed.      Diagnoses and all orders for this visit:    Microscopic colitis, unspecified microscopic colitis type    Irritable bowel syndrome with diarrhea    The patient overall is doing well on Pepto-Bismol.  He does have some instances with urgency to have a bowel movement.  He readily admits that stressors can certainly cause him to have more periods of loose stool.  There are no concerning signs of bleeding or weight loss.      Plan: Will prescribe Xifaxan 550 MG 1 by mouth TID for 14 days.            Will follow-up via telehealth or in office in 8 months.      This was a video visit for 15 minutes.

## 2020-08-20 ENCOUNTER — TELEPHONE (OUTPATIENT)
Dept: GASTROENTEROLOGY | Facility: CLINIC | Age: 70
End: 2020-08-20

## 2020-08-20 NOTE — TELEPHONE ENCOUNTER
Dr Jacome,  Patient's wife called. She stated she ordered Banatrol (supplement) online for Mr Meng. Is this okay for patient to take? Please advise. Thanks

## 2020-08-21 NOTE — TELEPHONE ENCOUNTER
"SPOUSE RETURNED CALL TO NIMISHA; ADVISED HER OF DR. ANSARI REPLY; STATES SHE WAS NOT SURE IF IT IS OKAY FOR HIM TO TAKE, STATES THIS IS JUST A \"FOOD\" PILL. I ADVISED SHE COULD CONSULT WITH HIS PCP FOR ADDITIONAL RECOMMENDATION. SHE VOICED UNDERSTANDING WITH NO ADDITIONAL QUESTIONS OR CONCERNS.    Rajiv Ansari MD         4:04 PM   I reviewed the medication.  This is not something I have prescribed in the past.     "

## 2020-08-21 NOTE — TELEPHONE ENCOUNTER
I called patient's wife back to give Dr Jacome's response on medication question. No answer; left voice message.

## 2021-03-26 ENCOUNTER — OFFICE VISIT (OUTPATIENT)
Dept: GASTROENTEROLOGY | Facility: CLINIC | Age: 71
End: 2021-03-26

## 2021-03-26 DIAGNOSIS — R14.0 BLOATING: ICD-10-CM

## 2021-03-26 DIAGNOSIS — K52.839 MICROSCOPIC COLITIS, UNSPECIFIED MICROSCOPIC COLITIS TYPE: Primary | ICD-10-CM

## 2021-03-26 DIAGNOSIS — Z80.0 FAMILY HISTORY OF COLON CANCER IN FATHER: ICD-10-CM

## 2021-03-26 PROCEDURE — 99214 OFFICE O/P EST MOD 30 MIN: CPT | Performed by: INTERNAL MEDICINE

## 2021-03-26 NOTE — PROGRESS NOTES
You have chosen to receive care through a telephone visit. Do you consent to use a telephone visit for your medical care today? Yes  Patient Name: Ace Meng  YOB: 1950   Medical Record number: [id number]     PCP: Warner Swann MD    Chief Complaint   Patient presents with   • Follow-up     microscopic colitis       History of Present Illness:   HPI  This was a telephone visit.  The patient consented for telephone visit.  Mr. Meng presents for telephone visit for follow-up of microscopic colitis.  The patient still has periods of loose stool without blood.  He would generally need to take Pepto-Bismol multiple times daily.  There is no history of nocturnal diarrhea.  Mr. Meng denies any localized abdominal pain.  He has a good appetite.  The patient denies any unexplained weight loss.  Mr. Meng did receive some relief in the past with the use of Entocort but the cost was prohibitive for him to take the medication regularly.  There is a family history of his father having colon cancer diagnosed possibly in his late 60s.  Mr. Meng last colonoscopy exam was 5 years ago.  He does not recall any polyps being found.  Past Medical History:   Diagnosis Date   • Abdominal pain, generalized    • Acute coronary syndrome (CMS/HCC)    • Allergic rhinitis    • Chronic low back pain    • Colitis    • Diabetes mellitus (CMS/HCC)    • Diarrhea    • Family history of colon cancer    • H/O colonoscopy 04/05/2016    Microscopic colitis   • Hyperlipidemia    • Hypertension    • Microscopic colitis        Past Surgical History:   Procedure Laterality Date   • COLONOSCOPY  07/2016    Dx with colitis         Current Outpatient Medications:   •  amLODIPine (NORVASC) 5 MG tablet, Take 1 tablet by mouth Daily. (Patient taking differently: Take 10 mg by mouth Daily.), Disp: 30 tablet, Rfl: 12  •  atorvastatin (LIPITOR) 20 MG tablet, Take 20 mg by mouth Daily., Disp: , Rfl:   •  Bismuth Subsalicylate  (PEPTO-BISMOL PO), Take  by mouth As Needed., Disp: , Rfl:   •  carvedilol (COREG) 12.5 MG tablet, Take 1 tablet by mouth 2 (Two) Times a Day With Meals. (Patient taking differently: Take 6.25 mg by mouth 2 (Two) Times a Day With Meals.), Disp: 60 tablet, Rfl: 12  •  glimepiride (AMARYL) 1 MG tablet, Take 1 mg by mouth Every Morning Before Breakfast., Disp: , Rfl:   •  glucose blood (FREESTYLE LITE) test strip, 1 each by Other route 2 (Two) Times a Week. Use as instructed, Disp: 100 each, Rfl: 0  •  lisinopril-hydrochlorothiazide (PRINZIDE,ZESTORETIC) 20-12.5 MG per tablet, TAKE ONE TABLET BY MOUTH TWICE A DAY (Patient taking differently: TAKE ONE TABLET BY MOUTH ONCE DAY), Disp: 180 tablet, Rfl: 3  •  riFAXIMin (Xifaxan) 550 MG tablet, Take 1 tablet by mouth Every 8 (Eight) Hours., Disp: 42 tablet, Rfl: 0    No Known Allergies    Family History   Problem Relation Age of Onset   • Cancer Father         colon       Social History     Socioeconomic History   • Marital status:      Spouse name: Not on file   • Number of children: Not on file   • Years of education: Not on file   • Highest education level: Not on file   Tobacco Use   • Smoking status: Never Smoker       Review of Systems   Constitutional: Negative for activity change, appetite change, fatigue, fever and unexpected weight change.   HENT: Negative for dental problem, hearing loss, mouth sores, postnasal drip, sneezing, trouble swallowing and voice change.    Eyes: Negative for pain, redness, itching and visual disturbance.   Respiratory: Negative for cough, choking, chest tightness, shortness of breath and wheezing.    Cardiovascular: Negative for chest pain, palpitations and leg swelling.   Gastrointestinal: Positive for abdominal distention (bloating) and diarrhea (uncontrollable). Negative for abdominal pain, anal bleeding, blood in stool, constipation, nausea, rectal pain and vomiting.   Endocrine: Negative for cold intolerance, heat  intolerance, polydipsia, polyphagia and polyuria.   Genitourinary: Negative.  Negative for dysuria, enuresis, flank pain, hematuria and urgency.   Musculoskeletal: Negative for arthralgias, back pain, gait problem, joint swelling and myalgias.   Skin: Negative for color change, pallor and rash.   Allergic/Immunologic: Negative for environmental allergies, food allergies and immunocompromised state.   Neurological: Negative for dizziness, tremors, seizures, facial asymmetry, speech difficulty, numbness and headaches.   Hematological: Negative for adenopathy.   Psychiatric/Behavioral: Negative for behavioral problems, confusion, dysphoric mood, hallucinations and self-injury.       There were no vitals filed for this visit.    Physical Exam    Diagnoses and all orders for this visit:    1. Microscopic colitis, unspecified microscopic colitis type (Primary)    2. Family history of colon cancer in father    3. Bloating    The patient has a history of microscopic colitis and there is clinical evidence of ongoing issues.  There is some improvement with Pepto-Bismol.  However, there is also a family history of colon cancer and there is indication for repeat colon examination.      Plan: Will schedule colonoscopy for further evaluation.           Will give the patient samples of budesonide 9 mg tablet 1 by mouth daily.      This was a telephone visit for 10 minutes.

## 2021-04-05 DIAGNOSIS — Z12.11 SCREENING FOR COLON CANCER: Primary | ICD-10-CM

## 2021-04-09 RX ORDER — SODIUM, POTASSIUM,MAG SULFATES 17.5-3.13G
354 SOLUTION, RECONSTITUTED, ORAL ORAL TAKE AS DIRECTED
Qty: 354 ML | Refills: 0 | Status: SHIPPED | OUTPATIENT
Start: 2021-04-09 | End: 2021-04-10

## 2021-04-19 ENCOUNTER — APPOINTMENT (OUTPATIENT)
Dept: PREADMISSION TESTING | Facility: HOSPITAL | Age: 71
End: 2021-04-19

## 2021-04-19 PROCEDURE — C9803 HOPD COVID-19 SPEC COLLECT: HCPCS

## 2021-04-19 PROCEDURE — U0004 COV-19 TEST NON-CDC HGH THRU: HCPCS

## 2021-04-20 LAB — SARS-COV-2 RNA PNL SPEC NAA+PROBE: NOT DETECTED

## 2021-04-22 ENCOUNTER — OUTSIDE FACILITY SERVICE (OUTPATIENT)
Dept: GASTROENTEROLOGY | Facility: CLINIC | Age: 71
End: 2021-04-22

## 2021-04-22 PROCEDURE — 88305 TISSUE EXAM BY PATHOLOGIST: CPT | Performed by: INTERNAL MEDICINE

## 2021-04-22 PROCEDURE — 45380 COLONOSCOPY AND BIOPSY: CPT | Performed by: INTERNAL MEDICINE

## 2021-04-22 PROCEDURE — 45385 COLONOSCOPY W/LESION REMOVAL: CPT | Performed by: INTERNAL MEDICINE

## 2021-04-23 ENCOUNTER — LAB REQUISITION (OUTPATIENT)
Dept: LAB | Facility: HOSPITAL | Age: 71
End: 2021-04-23

## 2021-04-23 DIAGNOSIS — Z12.11 ENCOUNTER FOR SCREENING FOR MALIGNANT NEOPLASM OF COLON: ICD-10-CM

## 2021-04-23 DIAGNOSIS — K52.89 OTHER SPECIFIED NONINFECTIVE GASTROENTERITIS AND COLITIS: ICD-10-CM

## 2021-04-23 DIAGNOSIS — R14.0 ABDOMINAL DISTENSION (GASEOUS): ICD-10-CM

## 2021-04-23 DIAGNOSIS — Z80.0 FAMILY HISTORY OF MALIGNANT NEOPLASM OF DIGESTIVE ORGANS: ICD-10-CM

## 2021-04-26 LAB
CYTO UR: NORMAL
LAB AP CASE REPORT: NORMAL
LAB AP CLINICAL INFORMATION: NORMAL
PATH REPORT.FINAL DX SPEC: NORMAL
PATH REPORT.GROSS SPEC: NORMAL

## 2021-04-27 ENCOUNTER — TELEPHONE (OUTPATIENT)
Dept: GASTROENTEROLOGY | Facility: CLINIC | Age: 71
End: 2021-04-27

## 2021-04-27 NOTE — TELEPHONE ENCOUNTER
----- Message from Rajiv Jacome MD sent at 4/26/2021  5:58 PM EDT -----  Let Mr. Meng know there is no microscopic colitis at this time on the biopsies.  He did have 1 adenoma type polyp and will need a repeat examination in 5 years.

## 2021-05-19 ENCOUNTER — TELEPHONE (OUTPATIENT)
Dept: GASTROENTEROLOGY | Facility: CLINIC | Age: 71
End: 2021-05-19

## 2021-07-16 ENCOUNTER — TELEPHONE (OUTPATIENT)
Dept: GASTROENTEROLOGY | Facility: CLINIC | Age: 71
End: 2021-07-16

## 2021-07-16 NOTE — TELEPHONE ENCOUNTER
I EXPLAINED TO MS CORONA THAT ITS FINE FOR PATIENT TO TAKE ALIGN, ATKINS COLON HEALTH, OR CULTURELLE PROBIOTIC. JUST PICK ONE. WIFE VOICED UNDERSTANDING.

## 2021-10-13 ENCOUNTER — TELEMEDICINE (OUTPATIENT)
Dept: GASTROENTEROLOGY | Facility: CLINIC | Age: 71
End: 2021-10-13

## 2021-10-13 DIAGNOSIS — R11.0 NAUSEA: ICD-10-CM

## 2021-10-13 DIAGNOSIS — K58.0 IRRITABLE BOWEL SYNDROME WITH DIARRHEA: Primary | ICD-10-CM

## 2021-10-13 PROCEDURE — 99214 OFFICE O/P EST MOD 30 MIN: CPT | Performed by: NURSE PRACTITIONER

## 2021-10-13 RX ORDER — AMITRIPTYLINE HYDROCHLORIDE 10 MG/1
10 TABLET, FILM COATED ORAL NIGHTLY
Qty: 30 TABLET | Refills: 5 | Status: SHIPPED | OUTPATIENT
Start: 2021-10-13 | End: 2021-12-08 | Stop reason: SDUPTHER

## 2021-10-13 RX ORDER — UBIDECARENONE 100 MG
100 CAPSULE ORAL DAILY
COMMUNITY

## 2021-10-13 NOTE — PROGRESS NOTES
New Patient Consultation     Patient Name: Ace Meng  : 1950   MRN: [id number]     Chief Complaint:    Chief Complaint   Patient presents with   • Abdominal Pain   You have chosen to receive care through a telephone visit. Do you consent to use a telephone visit for your medical care today? Yes  Both parties are in the state of [place] at the time of the visit.     History of Present Illness: Ace Meng is a 71 y.o. male who is here today for a Gastroenterology Consultation for nausea and diarrhea.  Ace has suffered from nausea and diarrhea episodes for years.  There is no vomiting.  He does have a history of microscopic colitis.  He had a repeat lower endoscopy this year that showed remission of his microscopic colitis.  He continues to have episodes of nausea and diarrhea.  Sometimes he will have the symptoms daily and other times he will go several days without symptoms.  He is on a probiotic.  His family and him have made several dietary adjustments without improvement.  He has tried Xifaxan without benefit.  SCANNED - COLONOSCOPY (2021)    EGD in 2016- normal.   Subjective      Review of Systems:   Review of Systems   Constitutional: Negative for activity change, appetite change, chills, diaphoresis, fatigue, fever, unexpected weight gain and unexpected weight loss.   HENT: Negative for trouble swallowing.    Gastrointestinal: Positive for abdominal pain, diarrhea and nausea. Negative for abdominal distention, anal bleeding, blood in stool, constipation, rectal pain, vomiting, GERD and indigestion.       Past Medical History:   Past Medical History:   Diagnosis Date   • Abdominal pain, generalized    • Acute coronary syndrome (HCC)    • Allergic rhinitis    • Chronic low back pain    • Colitis    • Diabetes mellitus (HCC)    • Diarrhea    • Family history of colon cancer    • H/O colonoscopy 2016    Microscopic colitis   • Hyperlipidemia    • Hypertension    • Microscopic  colitis        Past Surgical History:   Past Surgical History:   Procedure Laterality Date   • COLONOSCOPY  07/2016    Dx with colitis       Family History:   Family History   Problem Relation Age of Onset   • Cancer Father         colon       Social History:   Social History     Socioeconomic History   • Marital status:    Tobacco Use   • Smoking status: Never Smoker       Alcohol/Tobacco History:   Social History     Substance and Sexual Activity   Alcohol Use None    Comment: Very infrequently     Social History     Tobacco Use   Smoking Status Never Smoker   Smokeless Tobacco Not on file       Medications:     Current Outpatient Medications:   •  Black Pepper-Turmeric 3-500 MG capsule, Take  by mouth., Disp: , Rfl:   •  coenzyme Q10 100 MG capsule, Take 100 mg by mouth Daily., Disp: , Rfl:   •  Omega-3 Fatty Acids (Omega-3 2100) 1050 MG capsule, Take  by mouth., Disp: , Rfl:   •  amitriptyline (ELAVIL) 10 MG tablet, Take 1 tablet by mouth Every Night., Disp: 30 tablet, Rfl: 5  •  amLODIPine (NORVASC) 5 MG tablet, Take 1 tablet by mouth Daily. (Patient taking differently: Take 10 mg by mouth Daily.), Disp: 30 tablet, Rfl: 12  •  atorvastatin (LIPITOR) 20 MG tablet, Take 20 mg by mouth Daily., Disp: , Rfl:   •  carvedilol (COREG) 12.5 MG tablet, Take 1 tablet by mouth 2 (Two) Times a Day With Meals. (Patient taking differently: Take 6.25 mg by mouth 2 (Two) Times a Day With Meals.), Disp: 60 tablet, Rfl: 12  •  glimepiride (AMARYL) 1 MG tablet, Take 1 mg by mouth Every Morning Before Breakfast., Disp: , Rfl:   •  glucose blood (FREESTYLE LITE) test strip, 1 each by Other route 2 (Two) Times a Week. Use as instructed, Disp: 100 each, Rfl: 0  •  lisinopril-hydrochlorothiazide (PRINZIDE,ZESTORETIC) 20-12.5 MG per tablet, TAKE ONE TABLET BY MOUTH TWICE A DAY (Patient taking differently: TAKE ONE TABLET BY MOUTH ONCE DAY), Disp: 180 tablet, Rfl: 3    Allergies:   No Known Allergies    Objective     Physical  Exam:  Vital Signs: There were no vitals filed for this visit.  There is no height or weight on file to calculate BMI.     Physical Exam  Neurological:      Mental Status: He is oriented to person, place, and time.   Psychiatric:         Mood and Affect: Mood normal.         Thought Content: Thought content normal.         Assessment / Plan      Assessment/Plan:   Diagnoses and all orders for this visit:    1. Irritable bowel syndrome with diarrhea (Primary)  -     amitriptyline (ELAVIL) 10 MG tablet; Take 1 tablet by mouth Every Night.  Dispense: 30 tablet; Refill: 5    2. Nausea  -     amitriptyline (ELAVIL) 10 MG tablet; Take 1 tablet by mouth Every Night.  Dispense: 30 tablet; Refill: 5       Will start out low-dose amitriptyline.  Discussed side effects of drowsiness and dizziness possible.  He will take this at bedtime.  Will mail out low FODMAP diet.    Follow Up:   Return in about 8 weeks (around 12/8/2021).    Plan of care reviewed with the patient at the conclusion of today's visit.  Education was provided regarding diagnosis, management, and any prescribed or recommended OTC medications.  Patient verbalized understanding of and agreement with management plan.     Time Statement:   Discussed plan of care in detail with patient today. Patient verbally understands and agrees. I have spent 25 minutes reviewing available diagnostics, obtaining history, examining the patient, developing a treatment plan, and educating the patient on disease process and plan of care.    ALEJANDRINA Ibrahim  [facility]

## 2021-11-16 ENCOUNTER — TELEPHONE (OUTPATIENT)
Dept: GASTROENTEROLOGY | Facility: CLINIC | Age: 71
End: 2021-11-16

## 2021-11-16 DIAGNOSIS — K58.0 IRRITABLE BOWEL SYNDROME WITH DIARRHEA: Primary | ICD-10-CM

## 2021-11-16 NOTE — TELEPHONE ENCOUNTER
Wife called wondering about patient since he is following the fodmap diet was wonder if he is need to see a dietitian. She is worried he will not be getting enough fiber in his diet. Please advise?

## 2021-11-17 NOTE — TELEPHONE ENCOUNTER
I am happy to refer him to dietitian if he likes.  If you look at the FODMAPs diet, there is plenty of fiber.  There are many fruits and vegetables that he can eat that have fiber in them.  He can also get fiber from oats, quinoa, buckwheat, walnuts, almonds, pecans, Cleem[place] seeds etc

## 2021-12-08 ENCOUNTER — TELEMEDICINE (OUTPATIENT)
Dept: GASTROENTEROLOGY | Facility: CLINIC | Age: 71
End: 2021-12-08

## 2021-12-08 DIAGNOSIS — R11.0 NAUSEA: ICD-10-CM

## 2021-12-08 DIAGNOSIS — K58.0 IRRITABLE BOWEL SYNDROME WITH DIARRHEA: ICD-10-CM

## 2021-12-08 PROCEDURE — 99441 PR PHYS/QHP TELEPHONE EVALUATION 5-10 MIN: CPT | Performed by: NURSE PRACTITIONER

## 2021-12-08 RX ORDER — AMITRIPTYLINE HYDROCHLORIDE 10 MG/1
10 TABLET, FILM COATED ORAL NIGHTLY
Qty: 30 TABLET | Refills: 5 | Status: SHIPPED | OUTPATIENT
Start: 2021-12-08 | End: 2022-08-10 | Stop reason: SDUPTHER

## 2022-08-10 ENCOUNTER — OFFICE VISIT (OUTPATIENT)
Dept: GASTROENTEROLOGY | Facility: CLINIC | Age: 72
End: 2022-08-10

## 2022-08-10 DIAGNOSIS — R11.0 NAUSEA: ICD-10-CM

## 2022-08-10 DIAGNOSIS — K58.0 IRRITABLE BOWEL SYNDROME WITH DIARRHEA: Primary | ICD-10-CM

## 2022-08-10 DIAGNOSIS — K52.839 MICROSCOPIC COLITIS, UNSPECIFIED MICROSCOPIC COLITIS TYPE: ICD-10-CM

## 2022-08-10 PROCEDURE — 99214 OFFICE O/P EST MOD 30 MIN: CPT | Performed by: NURSE PRACTITIONER

## 2022-08-10 RX ORDER — CARVEDILOL 6.25 MG/1
6.25 TABLET ORAL 2 TIMES DAILY
COMMUNITY
Start: 2022-07-05 | End: 2022-08-10

## 2022-08-10 RX ORDER — DICYCLOMINE HYDROCHLORIDE 10 MG/1
10 CAPSULE ORAL 3 TIMES DAILY PRN
Qty: 90 CAPSULE | Refills: 11 | Status: SHIPPED | OUTPATIENT
Start: 2022-08-10 | End: 2023-02-20

## 2022-08-10 RX ORDER — ONDANSETRON 4 MG/1
4 TABLET, ORALLY DISINTEGRATING ORAL EVERY 8 HOURS PRN
Qty: 30 TABLET | Refills: 5 | Status: SHIPPED | OUTPATIENT
Start: 2022-08-10

## 2022-08-10 RX ORDER — AMITRIPTYLINE HYDROCHLORIDE 10 MG/1
10 TABLET, FILM COATED ORAL NIGHTLY
Qty: 90 TABLET | Refills: 3 | Status: SHIPPED | OUTPATIENT
Start: 2022-08-10 | End: 2023-02-20

## 2022-08-10 NOTE — PROGRESS NOTES
Follow Up      Patient Name: Ace Meng  : 1950   MRN: [id number]     Chief Complaint:    Chief Complaint   Patient presents with   • GI Problem       History of Present Illness: Ace Meng is a 71 y.o. male who is here today for follow up on IBS- D.    Did have improvement on amitriptyline and low FODMAP diet.  Reports having some fatigue the past few weeks.  He reports having nausea and diarrhea. It is better than in was in the past.  His amitriptyline was increased to help with his insomnia by pcp but this did not help with his diarrhea or insomnia so was reduced back to 10 mg.  Having diarrhea about 2-3 episodes a day in between days of more normal stools.  There is no unintentional weight loss.  There is no heartburn, abdominal pain, or gerd.   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 Previously tried Xifaxan without benefit.      SCANNED - COLONOSCOPY (2021)-internal hemorrhoids, diverticulosis tubular adenoma in the ascending colon, random biopsies normal     EGD in 2016- normal.   Subjective      Review of Systems:   Review of Systems   Constitutional: Negative for activity change, appetite change, chills, diaphoresis, fatigue, fever, unexpected weight gain and unexpected weight loss.   HENT: Negative for trouble swallowing.    Gastrointestinal: Positive for diarrhea and nausea. Negative for abdominal distention, abdominal pain, anal bleeding, blood in stool, constipation, rectal pain, vomiting, GERD and indigestion.       Medications:     Current Outpatient Medications:   •  amitriptyline (ELAVIL) 10 MG tablet, Take 1 tablet by mouth Every Night., Disp: 90 tablet, Rfl: 3  •  amLODIPine (NORVASC) 5 MG tablet, Take 1 tablet by mouth Daily. (Patient taking differently: Take 10 mg by mouth Daily.), Disp: 30 tablet, Rfl: 12  •  atorvastatin (LIPITOR) 20 MG tablet, Take 20 mg by mouth Daily., Disp: , Rfl:   •  Black Pepper-Turmeric 3-500 MG capsule, Take  by mouth., Disp: , Rfl:    •  carvedilol (COREG) 12.5 MG tablet, Take 1 tablet by mouth 2 (Two) Times a Day With Meals. (Patient taking differently: Take 6.25 mg by mouth 2 (Two) Times a Day With Meals.), Disp: 60 tablet, Rfl: 12  •  clotrimazole (LOTRIMIN) 1 % external solution, APPLY SOLUTION EXTERNALLY ONCE DAILY, Disp: , Rfl:   •  coenzyme Q10 100 MG capsule, Take 100 mg by mouth Daily., Disp: , Rfl:   •  dicyclomine (Bentyl) 10 MG capsule, Take 1 capsule by mouth 3 (Three) Times a Day As Needed (diarrhea)., Disp: 90 capsule, Rfl: 11  •  glimepiride (AMARYL) 1 MG tablet, Take 1 mg by mouth Every Morning Before Breakfast., Disp: , Rfl:   •  glucose blood (FREESTYLE LITE) test strip, 1 each by Other route 2 (Two) Times a Week. Use as instructed, Disp: 100 each, Rfl: 0  •  lisinopril-hydrochlorothiazide (PRINZIDE,ZESTORETIC) 20-12.5 MG per tablet, TAKE ONE TABLET BY MOUTH TWICE A DAY (Patient taking differently: TAKE ONE TABLET BY MOUTH ONCE DAY), Disp: 180 tablet, Rfl: 3  •  Omega-3 Fatty Acids (Omega-3 2100) 1050 MG capsule, Take  by mouth., Disp: , Rfl:   •  ondansetron ODT (Zofran ODT) 4 MG disintegrating tablet, Place 1 tablet on the tongue Every 8 (Eight) Hours As Needed for Nausea or Vomiting., Disp: 30 tablet, Rfl: 5    Allergies:   No Known Allergies    Social History:   Social History     Socioeconomic History   • Marital status:    Tobacco Use   • Smoking status: Never Smoker        Surgical History:   Past Surgical History:   Procedure Laterality Date   • COLONOSCOPY  07/2016    Dx with colitis        Medical History:   Past Medical History:   Diagnosis Date   • Abdominal pain, generalized    • Acute coronary syndrome (HCC)    • Allergic rhinitis    • Chronic low back pain    • Colitis    • Diabetes mellitus (HCC)    • Diarrhea    • Family history of colon cancer    • H/O colonoscopy 04/05/2016    Microscopic colitis   • Hyperlipidemia    • Hypertension    • Microscopic colitis         Objective     Physical Exam:  Vital  "Signs: There were no vitals filed for this visit.  There is no height or weight on file to calculate BMI.     Physical Exam  Neurological:      Mental Status: He is oriented to person, place, and time.   Psychiatric:         Mood and Affect: Mood normal.         Thought Content: Thought content normal.         Assessment / Plan      Assessment/Plan:   Diagnoses and all orders for this visit:    1. Irritable bowel syndrome with diarrhea (Primary)  -     dicyclomine (Bentyl) 10 MG capsule; Take 1 capsule by mouth 3 (Three) Times a Day As Needed (diarrhea).  Dispense: 90 capsule; Refill: 11  -     amitriptyline (ELAVIL) 10 MG tablet; Take 1 tablet by mouth Every Night.  Dispense: 90 tablet; Refill: 3  Continue amitriptyline.  Did not respond to increased dose.  We will give him Bentyl to use as needed.  Continue to avoid \"trigger foods\".  2. Nausea  -     amitriptyline (ELAVIL) 10 MG tablet; Take 1 tablet by mouth Every Night.  Dispense: 90 tablet; Refill: 3  -     ondansetron ODT (Zofran ODT) 4 MG disintegrating tablet; Place 1 tablet on the tongue Every 8 (Eight) Hours As Needed for Nausea or Vomiting.  Dispense: 30 tablet; Refill: 5  If his nausea does not improve with time, recommend repeat endoscopy.  He will notify the office if he is not improving within the next few weeks to get back in for sooner follow-up.  3. Microscopic colitis, unspecified microscopic colitis type  Seemingly in remission       Follow Up:   Return in about 6 months (around 2/10/2023), or if symptoms worsen or fail to improve.    Plan of care reviewed with the patient at the conclusion of today's visit.  Education was provided regarding diagnosis, management, and any prescribed or recommended OTC medications.  Patient verbalized understanding of and agreement with management plan.   15-minute spent on telephone call  ALEJANDRINA Ibrahim  [facility]  "

## 2022-10-11 ENCOUNTER — PRIOR AUTHORIZATION (OUTPATIENT)
Dept: GASTROENTEROLOGY | Facility: CLINIC | Age: 72
End: 2022-10-11

## 2022-10-11 ENCOUNTER — TELEMEDICINE (OUTPATIENT)
Dept: GASTROENTEROLOGY | Facility: CLINIC | Age: 72
End: 2022-10-11

## 2022-10-11 DIAGNOSIS — K63.9 COLONIC THICKENING: ICD-10-CM

## 2022-10-11 DIAGNOSIS — K58.0 IRRITABLE BOWEL SYNDROME WITH DIARRHEA: Primary | ICD-10-CM

## 2022-10-11 DIAGNOSIS — K52.839 MICROSCOPIC COLITIS, UNSPECIFIED MICROSCOPIC COLITIS TYPE: ICD-10-CM

## 2022-10-11 DIAGNOSIS — R11.0 NAUSEA: ICD-10-CM

## 2022-10-11 PROCEDURE — 99213 OFFICE O/P EST LOW 20 MIN: CPT | Performed by: NURSE PRACTITIONER

## 2022-10-11 NOTE — PROGRESS NOTES
Follow Up      Patient Name: Ace Meng  : 1950   MRN: [id number]     Chief Complaint:    Chief Complaint   Patient presents with   • Diarrhea              History of Present Illness: Ace Meng is a 71 y.o. male who is here today for follow up on diarrhea.    Ace presented to his PCP with his typical diarrheal symptoms.  A CT abdomen with and without was performed which showed some small segment of colonic thickening at the hepatic flexure.  A colonoscopy was ordered.    Ace has a history of IBS that has been treated with amitriptyline and low FODMAP diet.  He also has a history of microscopic colitis which had been in remission.  He has now having multiple BMs a day that are liquid and he is having incontinent episodes.  His nausea persists.    Subjective      Review of Systems:   Review of Systems   Constitutional: Negative for activity change, appetite change, chills, diaphoresis, fatigue, fever, unexpected weight gain and unexpected weight loss.   HENT: Negative for trouble swallowing.    Gastrointestinal: Positive for abdominal pain, diarrhea and nausea. Negative for abdominal distention, anal bleeding, blood in stool, constipation, rectal pain, vomiting, GERD and indigestion.       Medications:     Current Outpatient Medications:   •  amitriptyline (ELAVIL) 10 MG tablet, Take 1 tablet by mouth Every Night., Disp: 90 tablet, Rfl: 3  •  amLODIPine (NORVASC) 5 MG tablet, Take 1 tablet by mouth Daily. (Patient taking differently: Take 10 mg by mouth Daily.), Disp: 30 tablet, Rfl: 12  •  atorvastatin (LIPITOR) 20 MG tablet, Take 20 mg by mouth Daily., Disp: , Rfl:   •  Black Pepper-Turmeric 3-500 MG capsule, Take  by mouth., Disp: , Rfl:   •  Budesonide (ENTOCORT EC) 3 MG 24 hr capsule, 3 tablets by mouth daily for 6 weeks, then 2 tablets by mouth daily for 2 weeks, Disp: 154 capsule, Rfl: 0  •  carvedilol (COREG) 12.5 MG tablet, Take 1 tablet by mouth 2 (Two) Times a Day With Meals.  (Patient taking differently: Take 6.25 mg by mouth 2 (Two) Times a Day With Meals.), Disp: 60 tablet, Rfl: 12  •  clotrimazole (LOTRIMIN) 1 % external solution, APPLY SOLUTION EXTERNALLY ONCE DAILY, Disp: , Rfl:   •  coenzyme Q10 100 MG capsule, Take 100 mg by mouth Daily., Disp: , Rfl:   •  dicyclomine (Bentyl) 10 MG capsule, Take 1 capsule by mouth 3 (Three) Times a Day As Needed (diarrhea)., Disp: 90 capsule, Rfl: 11  •  glimepiride (AMARYL) 1 MG tablet, Take 1 mg by mouth Every Morning Before Breakfast., Disp: , Rfl:   •  glucose blood (FREESTYLE LITE) test strip, 1 each by Other route 2 (Two) Times a Week. Use as instructed, Disp: 100 each, Rfl: 0  •  lisinopril-hydrochlorothiazide (PRINZIDE,ZESTORETIC) 20-12.5 MG per tablet, TAKE ONE TABLET BY MOUTH TWICE A DAY (Patient taking differently: TAKE ONE TABLET BY MOUTH ONCE DAY), Disp: 180 tablet, Rfl: 3  •  Omega-3 Fatty Acids (Omega-3 2100) 1050 MG capsule, Take  by mouth., Disp: , Rfl:   •  ondansetron ODT (Zofran ODT) 4 MG disintegrating tablet, Place 1 tablet on the tongue Every 8 (Eight) Hours As Needed for Nausea or Vomiting., Disp: 30 tablet, Rfl: 5    Allergies:   No Known Allergies    Social History:   Social History     Socioeconomic History   • Marital status:    Tobacco Use   • Smoking status: Never        Surgical History:   Past Surgical History:   Procedure Laterality Date   • COLONOSCOPY  07/2016    Dx with colitis        Medical History:   Past Medical History:   Diagnosis Date   • Abdominal pain, generalized    • Acute coronary syndrome (HCC)    • Allergic rhinitis    • Chronic low back pain    • Colitis    • Diabetes mellitus (HCC)    • Diarrhea    • Family history of colon cancer    • H/O colonoscopy 04/05/2016    Microscopic colitis   • Hyperlipidemia    • Hypertension    • Microscopic colitis         Objective     Physical Exam:  Vital Signs: There were no vitals filed for this visit.  There is no height or weight on file to calculate  BMI.     Physical Exam  Neurological:      Mental Status: He is oriented to person, place, and time.   Psychiatric:         Mood and Affect: Mood normal.         Thought Content: Thought content normal.         Assessment / Plan      Assessment/Plan:   Diagnoses and all orders for this visit:    1. Irritable bowel syndrome with diarrhea (Primary)    2. Colonic thickening    3. Microscopic colitis, unspecified microscopic colitis type    4. David Bello suffers from both IBS-D and microscopic colitis.  He had been doing well for this IBS on amitriptyline and the low FODMAP diet.  Recently he began having more diarrhea and had a CT scan which showed some colonic thickening.  We will restart his budesonide to treat his microscopic colitis.  I recommend an EGD due to persistent nausea.  He will follow-up with me in 4 weeks, if he is not seeing improvement, will consider repeat colonoscopy.  I have also ordered a CT abdomen and pelvis with and without contrast due to thickening on CT    Follow Up:   Return in about 4 weeks (around 11/8/2022).    Plan of care reviewed with the patient at the conclusion of today's visit.  Education was provided regarding diagnosis, management, and any prescribed or recommended OTC medications.  Patient verbalized understanding of and agreement with management plan.     Time Statement:   Discussed plan of care in detail with patient today. Patient verbally understands and agrees. I have spent 20 minutes reviewing available diagnostics, obtaining history, examining the patient, developing a treatment plan, and educating the patient on disease process and plan of care.     ALEJANDRINA Ibrahim  [facility]

## 2022-10-25 ENCOUNTER — HOSPITAL ENCOUNTER (OUTPATIENT)
Dept: CT IMAGING | Facility: HOSPITAL | Age: 72
Discharge: HOME | End: 2022-10-25
Admitting: NURSE PRACTITIONER

## 2022-10-25 DIAGNOSIS — K63.9 COLONIC THICKENING: ICD-10-CM

## 2022-10-25 DIAGNOSIS — K52.839 MICROSCOPIC COLITIS, UNSPECIFIED MICROSCOPIC COLITIS TYPE: ICD-10-CM

## 2022-10-25 DIAGNOSIS — R11.0 NAUSEA: ICD-10-CM

## 2022-10-25 LAB — CREAT BLDA-MCNC: 0.8 MG/DL (ref 0.6–1.3)

## 2022-10-25 PROCEDURE — 82565 ASSAY OF CREATININE: CPT

## 2022-10-25 PROCEDURE — 74178 CT ABD&PLV WO CNTR FLWD CNTR: CPT

## 2022-10-25 PROCEDURE — 25010000002 IOPAMIDOL 61 % SOLUTION: Performed by: NURSE PRACTITIONER

## 2022-10-25 RX ADMIN — IOPAMIDOL 100 ML: 612 INJECTION, SOLUTION INTRAVENOUS at 15:05

## 2022-11-03 ENCOUNTER — OFFICE VISIT (OUTPATIENT)
Dept: GASTROENTEROLOGY | Facility: CLINIC | Age: 72
End: 2022-11-03

## 2022-11-03 DIAGNOSIS — K58.0 IRRITABLE BOWEL SYNDROME WITH DIARRHEA: ICD-10-CM

## 2022-11-03 DIAGNOSIS — K52.839 MICROSCOPIC COLITIS, UNSPECIFIED MICROSCOPIC COLITIS TYPE: Primary | ICD-10-CM

## 2022-11-03 DIAGNOSIS — R11.0 NAUSEA: ICD-10-CM

## 2022-11-03 PROCEDURE — 99213 OFFICE O/P EST LOW 20 MIN: CPT | Performed by: NURSE PRACTITIONER

## 2022-11-03 NOTE — PROGRESS NOTES
Follow Up      Patient Name: Ace Meng  : 1950   MRN: [id number]     Chief Complaint:    Chief Complaint   Patient presents with   • GI Problem     You have chosen to receive care through a telephone visit. Do you consent to use a telephone visit for your medical care today? Yes   Both parties are located in the state of [place] at the time of the visit.    History of Present Illness: Ace Meng is a 72 y.o. male who is here today for follow up on diarrhea.    Ace reports he has been doing somewhat better on budesonide.  He is still having urgent diarrhea on occasion but overall better.  He remains on amitriptyline.  He is using Imodium as needed.  He has his EGD scheduled for later this month.    Subjective      Review of Systems:   Review of Systems   Constitutional: Negative for activity change, appetite change, chills, diaphoresis, fatigue, fever, unexpected weight gain and unexpected weight loss.   HENT: Negative for trouble swallowing.    Gastrointestinal: Positive for abdominal pain, diarrhea and nausea. Negative for abdominal distention, anal bleeding, blood in stool, constipation, rectal pain, vomiting, GERD and indigestion.       Medications:     Current Outpatient Medications:   •  amitriptyline (ELAVIL) 10 MG tablet, Take 1 tablet by mouth Every Night., Disp: 90 tablet, Rfl: 3  •  amLODIPine (NORVASC) 5 MG tablet, Take 1 tablet by mouth Daily. (Patient taking differently: Take 10 mg by mouth Daily.), Disp: 30 tablet, Rfl: 12  •  atorvastatin (LIPITOR) 20 MG tablet, Take 20 mg by mouth Daily., Disp: , Rfl:   •  Black Pepper-Turmeric 3-500 MG capsule, Take  by mouth., Disp: , Rfl:   •  Budesonide (ENTOCORT EC) 3 MG 24 hr capsule, 3 tablets by mouth daily for 6 weeks, then 2 tablets by mouth daily for 2 weeks, Disp: 154 capsule, Rfl: 0  •  carvedilol (COREG) 12.5 MG tablet, Take 1 tablet by mouth 2 (Two) Times a Day With Meals. (Patient taking differently: Take 6.25 mg by mouth 2  (Two) Times a Day With Meals.), Disp: 60 tablet, Rfl: 12  •  clotrimazole (LOTRIMIN) 1 % external solution, APPLY SOLUTION EXTERNALLY ONCE DAILY, Disp: , Rfl:   •  coenzyme Q10 100 MG capsule, Take 100 mg by mouth Daily., Disp: , Rfl:   •  dicyclomine (Bentyl) 10 MG capsule, Take 1 capsule by mouth 3 (Three) Times a Day As Needed (diarrhea)., Disp: 90 capsule, Rfl: 11  •  glimepiride (AMARYL) 1 MG tablet, Take 1 mg by mouth Every Morning Before Breakfast., Disp: , Rfl:   •  glucose blood (FREESTYLE LITE) test strip, 1 each by Other route 2 (Two) Times a Week. Use as instructed, Disp: 100 each, Rfl: 0  •  lisinopril-hydrochlorothiazide (PRINZIDE,ZESTORETIC) 20-12.5 MG per tablet, TAKE ONE TABLET BY MOUTH TWICE A DAY (Patient taking differently: TAKE ONE TABLET BY MOUTH ONCE DAY), Disp: 180 tablet, Rfl: 3  •  Omega-3 Fatty Acids (Omega-3 2100) 1050 MG capsule, Take  by mouth., Disp: , Rfl:   •  ondansetron ODT (Zofran ODT) 4 MG disintegrating tablet, Place 1 tablet on the tongue Every 8 (Eight) Hours As Needed for Nausea or Vomiting., Disp: 30 tablet, Rfl: 5    Allergies:   No Known Allergies    Social History:   Social History     Socioeconomic History   • Marital status:    Tobacco Use   • Smoking status: Never        Surgical History:   Past Surgical History:   Procedure Laterality Date   • COLONOSCOPY  07/2016    Dx with colitis        Medical History:   Past Medical History:   Diagnosis Date   • Abdominal pain, generalized    • Acute coronary syndrome (HCC)    • Allergic rhinitis    • Chronic low back pain    • Colitis    • Diabetes mellitus (HCC)    • Diarrhea    • Family history of colon cancer    • H/O colonoscopy 04/05/2016    Microscopic colitis   • Hyperlipidemia    • Hypertension    • Microscopic colitis         Objective     Physical Exam:  Vital Signs: There were no vitals filed for this visit.  There is no height or weight on file to calculate BMI.     Physical Exam  Neurological:      Mental  Status: He is oriented to person, place, and time.   Psychiatric:         Mood and Affect: Mood normal.         Thought Content: Thought content normal.         Assessment / Plan      Assessment/Plan:   Diagnoses and all orders for this visit:    1. Microscopic colitis, unspecified microscopic colitis type (Primary)  Continue budesonide, EGD to rule out celiac and investigate nausea  2. Irritable bowel syndrome with diarrhea  Continue amitriptyline  3. Nausea  EGD scheduled later this month       Follow Up:   Return in about 4 weeks (around 12/1/2022).    Plan of care reviewed with the patient at the conclusion of today's visit.  Education was provided regarding diagnosis, management, and any prescribed or recommended OTC medications.  Patient verbalized understanding of and agreement with management plan.     Time Statement:   Discussed plan of care in detail with patient today. Patient verbally understands and agrees. I have spent 20 minutes reviewing available diagnostics, obtaining history, examining the patient, developing a treatment plan, and educating the patient on disease process and plan of care.     ALEJANDRINA Ibrahim  [facility]

## 2022-11-23 ENCOUNTER — OUTSIDE FACILITY SERVICE (OUTPATIENT)
Dept: GASTROENTEROLOGY | Facility: CLINIC | Age: 72
End: 2022-11-23

## 2022-11-23 PROCEDURE — 43239 EGD BIOPSY SINGLE/MULTIPLE: CPT | Performed by: INTERNAL MEDICINE

## 2022-11-23 PROCEDURE — 88305 TISSUE EXAM BY PATHOLOGIST: CPT

## 2022-11-25 ENCOUNTER — LAB REQUISITION (OUTPATIENT)
Dept: LAB | Facility: HOSPITAL | Age: 72
End: 2022-11-25
Payer: MEDICARE

## 2022-11-25 DIAGNOSIS — K44.9 DIAPHRAGMATIC HERNIA WITHOUT OBSTRUCTION OR GANGRENE: ICD-10-CM

## 2022-11-25 DIAGNOSIS — K21.00 GASTRO-ESOPHAGEAL REFLUX DISEASE WITH ESOPHAGITIS, WITHOUT BLEEDING: ICD-10-CM

## 2022-11-25 DIAGNOSIS — R19.7 DIARRHEA, UNSPECIFIED: ICD-10-CM

## 2022-11-25 DIAGNOSIS — K52.839 MICROSCOPIC COLITIS, UNSPECIFIED: ICD-10-CM

## 2022-11-25 DIAGNOSIS — K31.7 POLYP OF STOMACH AND DUODENUM: ICD-10-CM

## 2022-11-28 LAB — REF LAB TEST METHOD: NORMAL

## 2022-11-30 ENCOUNTER — TELEPHONE (OUTPATIENT)
Dept: GASTROENTEROLOGY | Facility: CLINIC | Age: 72
End: 2022-11-30

## 2022-11-30 NOTE — TELEPHONE ENCOUNTER
Mrs Yusra Sorensen called. Patient was prescribed Cefdinir for sinus infection. Wife wants to know is it safe for him to take with his stomach issues? Please advise.

## 2023-02-20 ENCOUNTER — OFFICE VISIT (OUTPATIENT)
Dept: GASTROENTEROLOGY | Facility: CLINIC | Age: 73
End: 2023-02-20
Payer: MEDICARE

## 2023-02-20 DIAGNOSIS — K52.839 MICROSCOPIC COLITIS, UNSPECIFIED MICROSCOPIC COLITIS TYPE: Primary | ICD-10-CM

## 2023-02-20 DIAGNOSIS — R15.2 INCONTINENCE OF FECES WITH FECAL URGENCY: ICD-10-CM

## 2023-02-20 DIAGNOSIS — R15.9 INCONTINENCE OF FECES WITH FECAL URGENCY: ICD-10-CM

## 2023-02-20 DIAGNOSIS — K58.0 IRRITABLE BOWEL SYNDROME WITH DIARRHEA: ICD-10-CM

## 2023-02-20 PROCEDURE — 99214 OFFICE O/P EST MOD 30 MIN: CPT | Performed by: NURSE PRACTITIONER

## 2023-02-20 RX ORDER — COLESTIPOL HYDROCHLORIDE 5 G/5G
5 GRANULE, FOR SUSPENSION ORAL 2 TIMES DAILY
Qty: 60 EACH | Refills: 5 | Status: SHIPPED | OUTPATIENT
Start: 2023-02-20 | End: 2023-03-08

## 2023-02-20 NOTE — PROGRESS NOTES
Follow Up      Patient Name: Ace Meng  : 1950   MRN: [id number]     Chief Complaint:    Chief Complaint   Patient presents with   • GI Problem   You have chosen to receive care through a telephone visit. Do you consent to use a telephone visit for your medical care today? Yes   Both parties are located in the state of [place] at the time of the visit.      History of Present Illness: Ace Meng is a 72 y.o. male who is here today for follow up on diarrhea and nausea.    Ace reports he is still having diarrhea.  It worsened after having COVID.  Currently having between 2-5 diarrheal episodes a day.  There are nocturnal episodes and incontinent episodes which are greatly interfering with quality of life.  There is occasional abdominal pain but not often.  He continues to have nausea that comes intermittently.  Denies NSAID use.  He is uncertain whether budesonide really controlled his symptoms last as his symptoms had improved prior to starting budesonide and did occur intermittently while on budesonide.  His last biopsies were negative for microscopic colitis    Ace has a history of both irritable bowel syndrome and microscopic. Colitis. Previously tried Xifaxan without benefit.  Only minimal benefit from amitriptyline now and unable to increase dose due to side effects.  Previously did see some benefit with low FODMAP diet.    CT Abdomen Pelvis With & Without Contrast (10/25/2022 15:04)IMPRESSION:  1. No acute abnormality in the abdomen or pelvis.  2. Colonic diverticulosis without diverticulitis most pronounced in sigmoid colon.  3. Nonobstructing 3 mm left lower pole renal calculus.  4. Additional chronic findings above      ENDOSCOPY, INT (2022)-LA grade A esophagitis, multiple fundic gland polyps.  Biopsies negative for celiac    SCANNED - COLONOSCOPY (2021)-internal hemorrhoids, diverticulosis tubular adenoma in the ascending colon, random biopsies normal     EGD in 2016-  normal.     Subjective      Review of Systems:   Review of Systems   Constitutional: Negative for activity change, appetite change, chills, diaphoresis, fatigue, fever, unexpected weight gain and unexpected weight loss.   HENT: Negative for trouble swallowing.    Gastrointestinal: Positive for abdominal pain, diarrhea and nausea. Negative for abdominal distention, anal bleeding, blood in stool, constipation, rectal pain, vomiting, GERD and indigestion.       Medications:     Current Outpatient Medications:   •  amLODIPine (NORVASC) 5 MG tablet, Take 1 tablet by mouth Daily. (Patient taking differently: Take 10 mg by mouth Daily.), Disp: 30 tablet, Rfl: 12  •  atorvastatin (LIPITOR) 20 MG tablet, Take 20 mg by mouth Daily., Disp: , Rfl:   •  Black Pepper-Turmeric 3-500 MG capsule, Take  by mouth., Disp: , Rfl:   •  carvedilol (COREG) 12.5 MG tablet, Take 1 tablet by mouth 2 (Two) Times a Day With Meals. (Patient taking differently: Take 6.25 mg by mouth 2 (Two) Times a Day With Meals.), Disp: 60 tablet, Rfl: 12  •  clotrimazole (LOTRIMIN) 1 % external solution, APPLY SOLUTION EXTERNALLY ONCE DAILY, Disp: , Rfl:   •  coenzyme Q10 100 MG capsule, Take 100 mg by mouth Daily., Disp: , Rfl:   •  colestipol (COLESTID) 5 g granules, Take 5 g by mouth 2 (Two) Times a Day., Disp: 60 each, Rfl: 5  •  glimepiride (AMARYL) 1 MG tablet, Take 1 mg by mouth Every Morning Before Breakfast., Disp: , Rfl:   •  glucose blood (FREESTYLE LITE) test strip, 1 each by Other route 2 (Two) Times a Week. Use as instructed, Disp: 100 each, Rfl: 0  •  lisinopril-hydrochlorothiazide (PRINZIDE,ZESTORETIC) 20-12.5 MG per tablet, TAKE ONE TABLET BY MOUTH TWICE A DAY (Patient taking differently: TAKE ONE TABLET BY MOUTH ONCE DAY), Disp: 180 tablet, Rfl: 3  •  Omega-3 Fatty Acids (Omega-3 2100) 1050 MG capsule, Take  by mouth., Disp: , Rfl:   •  ondansetron ODT (Zofran ODT) 4 MG disintegrating tablet, Place 1 tablet on the tongue Every 8 (Eight) Hours  As Needed for Nausea or Vomiting., Disp: 30 tablet, Rfl: 5    Allergies:   No Known Allergies    Social History:   Social History     Socioeconomic History   • Marital status:    Tobacco Use   • Smoking status: Never        Surgical History:   Past Surgical History:   Procedure Laterality Date   • COLONOSCOPY  07/2016    Dx with colitis        Medical History:   Past Medical History:   Diagnosis Date   • Abdominal pain, generalized    • Acute coronary syndrome (HCC)    • Allergic rhinitis    • Chronic low back pain    • Colitis    • Diabetes mellitus (HCC)    • Diarrhea    • Family history of colon cancer    • H/O colonoscopy 04/05/2016    Microscopic colitis   • Hyperlipidemia    • Hypertension    • Microscopic colitis         Objective     Physical Exam:  Vital Signs: There were no vitals filed for this visit.  There is no height or weight on file to calculate BMI.     Physical Exam  Neurological:      Mental Status: He is oriented to person, place, and time.   Psychiatric:         Mood and Affect: Mood normal.         Thought Content: Thought content normal.         Assessment / Plan      Assessment/Plan:   Diagnoses and all orders for this visit:    1. Microscopic colitis, unspecified microscopic colitis type (Primary)  -     colestipol (COLESTID) 5 g granules; Take 5 g by mouth 2 (Two) Times a Day.  Dispense: 60 each; Refill: 5    2. Irritable bowel syndrome with diarrhea  -     colestipol (COLESTID) 5 g granules; Take 5 g by mouth 2 (Two) Times a Day.  Dispense: 60 each; Refill: 5    3. Incontinence of feces with fecal urgency  -     Ambulatory Referral to Physical Therapy Pelvic Floor    Medication instructions given-  He will call the clinic with a 2 week symptom update     Follow Up:   Return in about 3 months (around 5/20/2023).    Plan of care reviewed with the patient at the conclusion of today's visit.  Education was provided regarding diagnosis, management, and any prescribed or recommended OTC  medications.  Patient verbalized understanding of and agreement with management plan.     Time Statement:   Discussed plan of care in detail with patient today. Patient verbally understands and agrees. I have spent 20 minutes reviewing available diagnostics, obtaining history, examining the patient, developing a treatment plan, and educating the patient on disease process and plan of care.     ALEJANDRINA Ibrahim  [facility] [place]

## 2023-02-22 ENCOUNTER — TELEPHONE (OUTPATIENT)
Dept: GASTROENTEROLOGY | Facility: CLINIC | Age: 73
End: 2023-02-22
Payer: MEDICARE

## 2023-02-22 NOTE — TELEPHONE ENCOUNTER
Patient states that recent prescription that was ordered during last office visit never made it to their preferred [facility] pharmacy.    Verified that pharmacy on file is appropriate.    Please advise.

## 2023-02-22 NOTE — TELEPHONE ENCOUNTER
I spoke with Pharmacist Colestipol granules prescription was cancelled out. She reactivated prescription. She will let us know if there's any problems.

## 2023-03-08 ENCOUNTER — TELEPHONE (OUTPATIENT)
Dept: GASTROENTEROLOGY | Facility: CLINIC | Age: 73
End: 2023-03-08
Payer: MEDICARE

## 2023-03-08 DIAGNOSIS — R19.8 OTHER SPECIFIED SYMPTOMS AND SIGNS INVOLVING THE DIGESTIVE SYSTEM AND ABDOMEN: ICD-10-CM

## 2023-03-08 DIAGNOSIS — R15.2 INCONTINENCE OF FECES WITH FECAL URGENCY: ICD-10-CM

## 2023-03-08 DIAGNOSIS — K52.839 MICROSCOPIC COLITIS, UNSPECIFIED MICROSCOPIC COLITIS TYPE: Primary | ICD-10-CM

## 2023-03-08 DIAGNOSIS — K58.0 IRRITABLE BOWEL SYNDROME WITH DIARRHEA: ICD-10-CM

## 2023-03-08 DIAGNOSIS — Z78.9 OTHER SPECIFIED HEALTH STATUS: ICD-10-CM

## 2023-03-08 DIAGNOSIS — R15.9 INCONTINENCE OF FECES WITH FECAL URGENCY: ICD-10-CM

## 2023-03-08 NOTE — TELEPHONE ENCOUNTER
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  I want him to take Imodium, 2 tabs after first bowel movement and may take 2 more times after that daily.  We are going to repeat stool studies, I placed the order.  I would also like him to get updated blood work. Can we also have him perform the sucrase breath test? Thanks

## 2023-03-08 NOTE — TELEPHONE ENCOUNTER
Patients wife called to give stacy an update on how he is doing on his COLESTIPOL 5G GRANULES. She states that he doesn't see a big difference with it.     She states that he is still having Diarrhea, nausea ,and bloating . Also she states that the patient does not want to start physical therapy at the moment either until he gets better with him stomach.    Please advise and call pt back [phone].

## 2023-03-10 NOTE — TELEPHONE ENCOUNTER
I SPOKE WITH  CHLOE. STEVE'S RECOMMENDATION GIVEN. WIFE WROTE EVERYTHING DOWN. PATIENT VOICED UNDERSTANDING. PATIENT IS AGREEABLE TO REPEATING COLONOSCOPY.

## 2023-03-13 ENCOUNTER — LAB (OUTPATIENT)
Dept: LAB | Facility: HOSPITAL | Age: 73
End: 2023-03-13
Payer: MEDICARE

## 2023-03-23 ENCOUNTER — PREP FOR SURGERY (OUTPATIENT)
Dept: OTHER | Facility: HOSPITAL | Age: 73
End: 2023-03-23
Payer: MEDICARE

## 2023-03-23 DIAGNOSIS — R15.2 INCONTINENCE OF FECES WITH FECAL URGENCY: ICD-10-CM

## 2023-03-23 DIAGNOSIS — K58.0 IRRITABLE BOWEL SYNDROME WITH DIARRHEA: ICD-10-CM

## 2023-03-23 DIAGNOSIS — K52.839 MICROSCOPIC COLITIS, UNSPECIFIED MICROSCOPIC COLITIS TYPE: Primary | ICD-10-CM

## 2023-03-23 DIAGNOSIS — R15.9 INCONTINENCE OF FECES WITH FECAL URGENCY: ICD-10-CM

## 2023-03-29 ENCOUNTER — LAB (OUTPATIENT)
Dept: LAB | Facility: HOSPITAL | Age: 73
End: 2023-03-29
Payer: MEDICARE

## 2023-03-29 DIAGNOSIS — R15.9 INCONTINENCE OF FECES WITH FECAL URGENCY: ICD-10-CM

## 2023-03-29 DIAGNOSIS — R15.2 INCONTINENCE OF FECES WITH FECAL URGENCY: ICD-10-CM

## 2023-03-29 DIAGNOSIS — K58.0 IRRITABLE BOWEL SYNDROME WITH DIARRHEA: ICD-10-CM

## 2023-03-29 DIAGNOSIS — K52.839 MICROSCOPIC COLITIS, UNSPECIFIED MICROSCOPIC COLITIS TYPE: ICD-10-CM

## 2023-03-29 LAB — C DIFF TOX GENS STL QL NAA+PROBE: NOT DETECTED

## 2023-03-29 PROCEDURE — 87493 C DIFF AMPLIFIED PROBE: CPT

## 2023-03-29 PROCEDURE — 87427 SHIGA-LIKE TOXIN AG IA: CPT

## 2023-03-29 PROCEDURE — 82653 EL-1 FECAL QUANTITATIVE: CPT

## 2023-03-29 PROCEDURE — 83993 ASSAY FOR CALPROTECTIN FECAL: CPT | Performed by: NURSE PRACTITIONER

## 2023-03-29 PROCEDURE — 87209 SMEAR COMPLEX STAIN: CPT | Performed by: NURSE PRACTITIONER

## 2023-03-29 PROCEDURE — 87045 FECES CULTURE AEROBIC BACT: CPT

## 2023-03-29 PROCEDURE — 87177 OVA AND PARASITES SMEARS: CPT | Performed by: NURSE PRACTITIONER

## 2023-03-29 PROCEDURE — 87046 STOOL CULTR AEROBIC BACT EA: CPT

## 2023-04-03 LAB
BACTERIA SPEC CULT: NORMAL
BACTERIA SPEC CULT: NORMAL
CAMPYLOBACTER STL CULT: NORMAL
E COLI SXT STL QL IA: NEGATIVE
SALM + SHIG STL CULT: NORMAL

## 2023-04-07 DIAGNOSIS — K86.89 PANCREATIC INSUFFICIENCY: Primary | ICD-10-CM

## 2023-04-07 DIAGNOSIS — E74.31 SUCRASE-ISOMALTASE DEFICIENCY: Primary | ICD-10-CM

## 2023-04-07 LAB — ELASTASE PANC STL-MCNT: 133 UG ELAST./G

## 2023-04-07 RX ORDER — PANCRELIPASE 36000; 180000; 114000 [USP'U]/1; [USP'U]/1; [USP'U]/1
72000 CAPSULE, DELAYED RELEASE PELLETS ORAL
Qty: 210 CAPSULE | Refills: 11 | Status: SHIPPED | OUTPATIENT
Start: 2023-04-07

## 2023-04-07 RX ORDER — SACROSIDASE 8500 [IU]/ML
2 SOLUTION ORAL 4 TIMES DAILY
Qty: 360 ML | Refills: 11 | Status: SHIPPED | OUTPATIENT
Start: 2023-04-07

## 2023-04-12 DIAGNOSIS — E74.31 SUCRASE-ISOMALTASE DEFICIENCY: ICD-10-CM

## 2023-04-12 DIAGNOSIS — K86.89 PANCREATIC INSUFFICIENCY: Primary | ICD-10-CM

## 2023-04-13 ENCOUNTER — PRIOR AUTHORIZATION (OUTPATIENT)
Dept: GASTROENTEROLOGY | Facility: CLINIC | Age: 73
End: 2023-04-13
Payer: MEDICARE

## 2023-04-17 ENCOUNTER — MEDICATION THERAPY MANAGEMENT (OUTPATIENT)
Dept: GASTROENTEROLOGY | Facility: CLINIC | Age: 73
End: 2023-04-17
Payer: MEDICARE

## 2023-04-18 ENCOUNTER — PATIENT MESSAGE (OUTPATIENT)
Dept: GASTROENTEROLOGY | Facility: CLINIC | Age: 73
End: 2023-04-18
Payer: MEDICARE

## 2023-04-19 NOTE — TELEPHONE ENCOUNTER
From: Ace Meng  To: Franchesca Last  Sent: 4/18/2023 6:45 PM EDT  Subject: Colonoscopy     We received the Sucraid today. We were wondering about the purpose and timing of scheduled colonoscopy? Unless there is a particular reason we were wondering if it would be beneficial to give these enzymes some time to see if they are going to make a difference before undergoing another colonoscopy. In other words is there any reason to do it now verses rescheduling after giving this medicine a chance to work.   Thank you

## 2023-05-02 ENCOUNTER — TELEPHONE (OUTPATIENT)
Dept: GASTROENTEROLOGY | Facility: CLINIC | Age: 73
End: 2023-05-02
Payer: MEDICARE

## 2023-05-02 NOTE — TELEPHONE ENCOUNTER
Regarding: Colonoscopy   Contact: [phone]  ----- Message from СЕРГЕЙ Santillan sent at 5/2/2023 11:34 AM EDT -----       ----- Message sent from Zuleyma SUBRAMANIAN RMA to Ace Meng at 5/2/2023 11:34 AM -----   Mr Meng,  I mailed your form for you to sign. I don't see that stool test is missing. I will forward your message to Franchesca to make sure. Thanks       ----- Message -----       From:Ace Meng       Sent:5/2/2023 10:17 AM EDT         To:Patient Medical Advice Request Message List    Subject:Colonoscopy     The lab called and said they forgot to do a test with the stool sample provided and needed another sample.  They could not tell me what the missing test was for.  Do you know and do you still want it done.  Thank you  P.S.  Still have not received paper mailed by Mario for me to sign for my wife and daughter to talk to you about my health concerns      ----- Message -----       From:Ace Meng       Sent:4/21/2023 12:38 AM EDT         To:Patient Medical Advice Request Message List    Subject:Colonoscopy     Thank you!  Ace wants to cancel the pre-admission testing on April 25 and the colonoscopy on May 2 2023.  If the Sucraid doesn't make a difference in a few weeks we will reschedule.      ----- Message -----       From:Franchesca Last       Sent:4/19/2023  3:31 PM EDT         To:Ace Meng    Subject:Colonoscopy     He is having a normal colonoscopy.  He just needs some updated information to make sure the procedure is safe.    I would expect the Sucraid to work fairly quickly to know if it is helping so within 1 to 2 weeks.    You cannot tell how severe EPI is based on a colonoscopy it is just checking to make sure were not dealing with another diagnosis as well.    Rectal steroids only reach a small amount of the colon so are not especially effective for microscopic colitis as it effects the whole colon.    Franchesca       ----- Message -----       From:Ace LITTLEJOHN  Yusra       Sent:4/19/2023  2:36 PM EDT         To:Patient Medical Advice Request Message List    Subject:Colonoscopy     I have a question about the pre-admission testing on April 25.  Is Ace having a different kind of colonoscopy? He's never had to have pre-admission testing before.  How long do you think he needs to be on the Sucraid before we know if it's going to make a difference?    The nurse that originally called me with his breath and stool results said he still needed to have a colonoscopy to see how severe  a case of EPI he had.  Is that accurate?  Will the colonoscopy show that?  Also I read about a woman who took rectal steroids for her microscopic colitis and it helped her.  What do you think about those?  Thank you!      ----- Message -----       From:Franchesca Last       Sent:4/19/2023  8:42 AM EDT         To:Ace Meng    Subject:Colonoscopy     That is fine.  I just wanted to make sure we were not going to long with his uncontrolled symptoms as he has been struggling with the symptoms for a really long time.  We can cancel it for now and reschedule if he would like, please let me know how you would like me to proceed.       ----- Message -----       From:Ace Meng       Sent:4/18/2023  6:45 PM EDT         To:Franchesca Last    Subject:Colonoscopy     We received the Sucraid today. We were wondering about the purpose and timing of scheduled colonoscopy? Unless there is a particular reason we were wondering if it would be beneficial to give these enzymes some time to see if they are going to make a difference before undergoing another colonoscopy. In other words is there any reason to do it now verses rescheduling after giving this medicine a chance to work.   Thank you

## 2023-05-02 NOTE — TELEPHONE ENCOUNTER
I do not need any more stool tests from him, I am not sure what they are referring to.  can you check on the form they need?

## 2023-05-24 ENCOUNTER — TELEPHONE (OUTPATIENT)
Dept: GASTROENTEROLOGY | Facility: CLINIC | Age: 73
End: 2023-05-24
Payer: MEDICARE

## 2023-05-24 NOTE — TELEPHONE ENCOUNTER
PATIENTS WIFE CALLED TO SEE ABOUT APPT SCHEDULED FOR TODAY. I INFORMED HER THAT THE APPT WAS CANCELED. SENT HER TO SCHEDULING TEAM TO RESCHEDULE.

## 2023-06-06 ENCOUNTER — TELEMEDICINE (OUTPATIENT)
Dept: GASTROENTEROLOGY | Facility: CLINIC | Age: 73
End: 2023-06-06
Payer: MEDICARE

## 2023-06-06 DIAGNOSIS — E74.31 SUCRASE-ISOMALTASE DEFICIENCY: ICD-10-CM

## 2023-06-06 DIAGNOSIS — K52.9 GASTROENTERITIS: ICD-10-CM

## 2023-06-06 DIAGNOSIS — K52.839 MICROSCOPIC COLITIS, UNSPECIFIED MICROSCOPIC COLITIS TYPE: ICD-10-CM

## 2023-06-06 DIAGNOSIS — K86.89 PANCREATIC INSUFFICIENCY: Primary | ICD-10-CM

## 2023-06-06 PROBLEM — K86.81 EXOCRINE PANCREATIC INSUFFICIENCY: Status: ACTIVE | Noted: 2023-06-06

## 2023-06-06 NOTE — PROGRESS NOTES
Follow Up      Patient Name: Ace Meng  : 1950   MRN: [id number]     Chief Complaint:    Chief Complaint   Patient presents with    GI Problem       History of Present Illness: Ace Meng is a 72 y.o. male who is here today for follow up on chronic diarrhea    Most recently, work up reveal but EPI and Sucrase -Isomaltase deficiency.  He started the supplements and has been feeling much better.  Having about 85% improvement with this. Yesterday he started having nausea, vomiting, and diarrhea- no fever- no recent abx use. He plans to see the nutritionist in July.     Pancreatic fecal elastase low at 133  Sucraid breath test abnormal at 2.7%.     For his chronic diarrhea: He has tried Xifaxan without benefit.  Amitriptyline was initially helpful but lost effect.  Low FODMAP diet is slightly helpful. Colestipol not helpful.   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 Trial of budesonide not helpful.     CT Abdomen Pelvis With & Without Contrast (10/25/2022 15:04) IMPRESSION:  1. No acute abnormality in the abdomen or pelvis.  2. Colonic diverticulosis without diverticulitis most pronounced in sigmoid colon.  3. Nonobstructing 3 mm left lower pole renal calculus.  4. Additional chronic findings above.    ENDOSCOPY, INT (2022)-LA grade A esophagitis, multiple fundic gland polyps.  Biopsies negative for celiac     SCANNED - COLONOSCOPY (2021)-internal hemorrhoids, diverticulosis tubular adenoma in the ascending colon, random biopsies normal     EGD in 2016- normal.     Subjective      Review of Systems:   Review of Systems   Constitutional:  Negative for activity change, appetite change, chills, diaphoresis, fatigue, fever, unexpected weight gain and unexpected weight loss.   HENT:  Negative for trouble swallowing.    Gastrointestinal:  Positive for abdominal pain, diarrhea, nausea and vomiting. Negative for abdominal distention, anal bleeding, blood in  stool, constipation, rectal pain, GERD and indigestion.     Medications:     Current Outpatient Medications:     amLODIPine (NORVASC) 5 MG tablet, Take 1 tablet by mouth Daily. (Patient taking differently: Take 10 mg by mouth Daily.), Disp: 30 tablet, Rfl: 12    atorvastatin (LIPITOR) 20 MG tablet, Take 20 mg by mouth Daily., Disp: , Rfl:     Black Pepper-Turmeric 3-500 MG capsule, Take  by mouth., Disp: , Rfl:     carvedilol (COREG) 12.5 MG tablet, Take 1 tablet by mouth 2 (Two) Times a Day With Meals. (Patient taking differently: Take 6.25 mg by mouth 2 (Two) Times a Day With Meals.), Disp: 60 tablet, Rfl: 12    clotrimazole (LOTRIMIN) 1 % external solution, APPLY SOLUTION EXTERNALLY ONCE DAILY, Disp: , Rfl:     coenzyme Q10 100 MG capsule, Take 100 mg by mouth Daily., Disp: , Rfl:     glimepiride (AMARYL) 1 MG tablet, Take 1 mg by mouth Every Morning Before Breakfast., Disp: , Rfl:     glucose blood (FREESTYLE LITE) test strip, 1 each by Other route 2 (Two) Times a Week. Use as instructed, Disp: 100 each, Rfl: 0    lisinopril-hydrochlorothiazide (PRINZIDE,ZESTORETIC) 20-12.5 MG per tablet, TAKE ONE TABLET BY MOUTH TWICE A DAY (Patient taking differently: TAKE ONE TABLET BY MOUTH ONCE DAY), Disp: 180 tablet, Rfl: 3    Omega-3 Fatty Acids (Omega-3 2100) 1050 MG capsule, Take  by mouth., Disp: , Rfl:     ondansetron ODT (Zofran ODT) 4 MG disintegrating tablet, Place 1 tablet on the tongue Every 8 (Eight) Hours As Needed for Nausea or Vomiting., Disp: 30 tablet, Rfl: 5    Pancrelipase, Lip-Prot-Amyl, (Creon) 62342-704079 units capsule delayed-release particles capsule, Take 2 capsules by mouth 3 (Three) Times a Day With Meals. And 1 cap with meals, Disp: 210 capsule, Rfl: 11    Sacrosidase (Sucraid) 8500 UNIT/ML solution, Take 2 mL by mouth 4 (Four) Times a Day. With meals and snacks, Disp: 360 mL, Rfl: 11    Allergies:   No Known Allergies    Social History:   Social History     Socioeconomic History    Marital  status:    Tobacco Use    Smoking status: Never        Surgical History:   Past Surgical History:   Procedure Laterality Date    COLONOSCOPY  07/2016    Dx with colitis        Medical History:   Past Medical History:   Diagnosis Date    Abdominal pain, generalized     Acute coronary syndrome     Allergic rhinitis     Chronic low back pain     Colitis     Diabetes mellitus     Diarrhea     Family history of colon cancer     H/O colonoscopy 04/05/2016    Microscopic colitis    Hyperlipidemia     Hypertension     Microscopic colitis         Objective     Physical Exam:  Vital Signs: There were no vitals filed for this visit.  There is no height or weight on file to calculate BMI.     Physical Exam  Neurological:      Mental Status: He is oriented to person, place, and time.   Psychiatric:         Mood and Affect: Mood normal.         Thought Content: Thought content normal.       Assessment / Plan      Assessment/Plan:   Diagnoses and all orders for this visit:    1. Pancreatic insufficiency (Primary)    2. Sucrase-isomaltase deficiency    3. Microscopic colitis, unspecified microscopic colitis type    4. Gastroenteritis    Ace had been doing well on Sucraid and Creon until yesterday.  He is having nausea, vomiting, and diarrhea more acutely than his typical symptoms.  I believe he may have gastroenteritis.  We discussed clear liquid diet and using Zofran as needed.  Importance of hydration stressed.  He will notify me if he does not have improvement of symptoms.    Otherwise, continue Creon and Sucraid.  Continue with plan to meet with dietitian.    Follow Up:   Return in about 3 months (around 9/6/2023).    Plan of care reviewed with the patient at the conclusion of today's visit.  Education was provided regarding diagnosis, management, and any prescribed or recommended OTC medications.  Patient verbalized understanding of and agreement with management plan.     Time Statement:   Discussed plan of care in  detail with patient today. Patient verbally understands and agrees. I have spent 30 minutes reviewing available diagnostics, obtaining history, examining the patient, developing a treatment plan, and educating the patient on disease process and plan of care.     ALEJANDRINA Ibrahim  [facility]

## 2023-09-18 ENCOUNTER — TELEMEDICINE (OUTPATIENT)
Dept: GASTROENTEROLOGY | Facility: CLINIC | Age: 73
End: 2023-09-18
Payer: MEDICARE

## 2023-09-18 DIAGNOSIS — K86.89 PANCREATIC INSUFFICIENCY: ICD-10-CM

## 2023-09-18 DIAGNOSIS — R11.0 NAUSEA: Primary | ICD-10-CM

## 2023-09-18 DIAGNOSIS — E74.31 SUCRASE-ISOMALTASE DEFICIENCY: ICD-10-CM

## 2023-09-18 PROCEDURE — 1159F MED LIST DOCD IN RCRD: CPT | Performed by: NURSE PRACTITIONER

## 2023-09-18 PROCEDURE — 1160F RVW MEDS BY RX/DR IN RCRD: CPT | Performed by: NURSE PRACTITIONER

## 2023-09-18 PROCEDURE — 99214 OFFICE O/P EST MOD 30 MIN: CPT | Performed by: NURSE PRACTITIONER

## 2023-09-18 NOTE — PROGRESS NOTES
Follow Up      Patient Name: Ace Meng  : 1950   MRN: [id number]     Chief Complaint:    Chief Complaint   Patient presents with    Diarrhea       History of Present Illness: Ace Meng is a 72 y.o. male who is here today for follow up on diarrhea     Most recently, work up reveal but EPI and Sucrase -Isomaltase deficiency.  He started the supplements and has been feeling much better.  Having about 85% improvement with this. He stills has some abdominal upset and nausea.  Pharmacist suggested taking adilia and b6- he wonders my opinion on this. He has seen a nutritionist.    Having a BM about once a day- occasioanlly has diarrhea episodes that cause him a lot of stress.  Nausea and abdominal upset are still present.    GERD controlled on ppi.       Pancreatic fecal elastase low at 133  Sucraid breath test abnormal at 2.7%.      For his chronic diarrhea: He has tried Xifaxan without benefit.  Amitriptyline was initially helpful but lost effect.  Low FODMAP diet was slightly helpful. Colestipol not helpful.   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 Trial of budesonide not helpful.      CT Abdomen Pelvis With & Without Contrast (10/25/2022 15:04) IMPRESSION:  1. No acute abnormality in the abdomen or pelvis.  2. Colonic diverticulosis without diverticulitis most pronounced in sigmoid colon.  3. Nonobstructing 3 mm left lower pole renal calculus.  4. Additional chronic findings above.     ENDOSCOPY, INT (2022)-LA grade A esophagitis, multiple fundic gland polyps.  Biopsies negative for celiac     SCANNED - COLONOSCOPY (2021)-internal hemorrhoids, diverticulosis tubular adenoma in the ascending colon, random biopsies normal     EGD in 2016- normal.       Subjective      Review of Systems:   Review of Systems   Constitutional:  Negative for activity change, appetite change, chills, diaphoresis, fatigue, fever, unexpected weight gain and unexpected weight  loss.   HENT:  Negative for trouble swallowing.    Gastrointestinal:  Positive for abdominal pain, diarrhea and nausea. Negative for abdominal distention, anal bleeding, blood in stool, constipation, rectal pain, vomiting, GERD and indigestion.     Medications:     Current Outpatient Medications:     Pancrelipase, Lip-Prot-Amyl, (Creon) 46069-030618 units capsule delayed-release particles capsule, Take 2 capsules by mouth 3 (Three) Times a Day With Meals. And 1 cap with meals, Disp: 210 capsule, Rfl: 11    Sacrosidase (Sucraid) 8500 UNIT/ML solution, Take 2 mL by mouth 4 (Four) Times a Day. With meals and snacks, Disp: 360 mL, Rfl: 11    amLODIPine (NORVASC) 5 MG tablet, Take 1 tablet by mouth Daily. (Patient taking differently: Take 10 mg by mouth Daily.), Disp: 30 tablet, Rfl: 12    atorvastatin (LIPITOR) 20 MG tablet, Take 20 mg by mouth Daily., Disp: , Rfl:     carvedilol (COREG) 12.5 MG tablet, Take 1 tablet by mouth 2 (Two) Times a Day With Meals. (Patient taking differently: Take 6.25 mg by mouth 2 (Two) Times a Day With Meals.), Disp: 60 tablet, Rfl: 12    coenzyme Q10 100 MG capsule, Take 100 mg by mouth Daily., Disp: , Rfl:     glimepiride (AMARYL) 1 MG tablet, Take 1 mg by mouth Every Morning Before Breakfast., Disp: , Rfl:     glucose blood (FREESTYLE LITE) test strip, 1 each by Other route 2 (Two) Times a Week. Use as instructed, Disp: 100 each, Rfl: 0    lisinopril-hydrochlorothiazide (PRINZIDE,ZESTORETIC) 20-12.5 MG per tablet, TAKE ONE TABLET BY MOUTH TWICE A DAY (Patient taking differently: TAKE ONE TABLET BY MOUTH ONCE DAY), Disp: 180 tablet, Rfl: 3    Omega-3 Fatty Acids (Omega-3 2100) 1050 MG capsule, Take  by mouth., Disp: , Rfl:     Allergies:   No Known Allergies    Social History:   Social History     Socioeconomic History    Marital status:    Tobacco Use    Smoking status: Never        Surgical History:   Past Surgical History:   Procedure Laterality Date    COLONOSCOPY  07/2016     Dx with colitis        Medical History:   Past Medical History:   Diagnosis Date    Abdominal pain, generalized     Acute coronary syndrome     Allergic rhinitis     Chronic low back pain     Colitis     Diabetes mellitus     Diarrhea     Family history of colon cancer     H/O colonoscopy 04/05/2016    Microscopic colitis    Hyperlipidemia     Hypertension     Microscopic colitis         Objective     Physical Exam:  Vital Signs: There were no vitals filed for this visit.  There is no height or weight on file to calculate BMI.     Physical Exam  Constitutional:       General: He is not in acute distress.     Appearance: He is well-developed.   Pulmonary:      Effort: Pulmonary effort is normal. No accessory muscle usage or respiratory distress.   Skin:     Coloration: Skin is not pale.      Findings: No erythema.   Neurological:      Mental Status: He is alert and oriented to person, place, and time.   Psychiatric:         Speech: Speech normal.         Behavior: Behavior normal.         Thought Content: Thought content normal.         Judgment: Judgment normal.       Assessment / Plan      Assessment/Plan:   Diagnoses and all orders for this visit:    1. Nausea (Primary)  -     US Gallbladder; Future  -     NM HIDA SCAN WITH PHARMACOLOGICAL INTERVENTION; Future    2. Pancreatic insufficiency  -     Pancrelipase, Lip-Prot-Amyl, (Creon) 24321-962151 units capsule delayed-release particles capsule; Take 2 capsules by mouth 3 (Three) Times a Day With Meals. And 1 cap with meals  Dispense: 210 capsule; Refill: 11    3. Sucrase-isomaltase deficiency  -     Sacrosidase (Sucraid) 8500 UNIT/ML solution; Take 2 mL by mouth 4 (Four) Times a Day. With meals and snacks  Dispense: 360 mL; Refill: 11       Continue creon and Sucraid with meals.  As nausea persists, obtain ultrasound gallbladder and HIDA. Agree with trial of daily b6 and gingerroot with meals   Follow Up:   Return if symptoms worsen or fail to improve.    Plan of  care reviewed with the patient at the conclusion of today's visit.  Education was provided regarding diagnosis, management, and any prescribed or recommended OTC medications.  Patient verbalized understanding of and agreement with management plan.         ALEJANDRINA Ibrahim  [facility]

## 2023-09-19 RX ORDER — PANCRELIPASE 36000; 180000; 114000 [USP'U]/1; [USP'U]/1; [USP'U]/1
72000 CAPSULE, DELAYED RELEASE PELLETS ORAL
Qty: 210 CAPSULE | Refills: 11 | Status: SHIPPED | OUTPATIENT
Start: 2023-09-19

## 2023-09-19 RX ORDER — SACROSIDASE 8500 [IU]/ML
2 SOLUTION ORAL 4 TIMES DAILY
Qty: 360 ML | Refills: 11 | Status: SHIPPED | OUTPATIENT
Start: 2023-09-19

## 2023-10-27 ENCOUNTER — HOSPITAL ENCOUNTER (OUTPATIENT)
Dept: NUTRITION | Facility: HOSPITAL | Age: 73
Setting detail: RECURRING SERIES
Discharge: HOME | End: 2023-10-27

## 2023-10-27 NOTE — PROGRESS NOTES
Patient seen via Zoom for 30 minutes. Wife present. Patient referred for CSID and EPI, seen previously in July 2023.     Patient reports he has seen great improvements in his symptoms. Patient reports he rarely has GI upset. Patient had questions pertaining to what foods he should continue to monitored. Discussed with patient monitoring fat intake and starch/sucrose intake. Suggested ~50g of fat per day and ~25 grams of CHO per meal, increasing as tolerated. Patient reports taking sucraid before all meals.     Patient had questions regarding whether or not sucraid would be a life time medication. Discussed with patient that CSID is a life long condition and sucraid is needed when consuming high sucrose foods. Patient accepting of information.     Patient to call back and schedule fu if GI symptoms reoccur.     I have reviewed the notes, assessments, and documentation performed by Malathi Norris, I concur with her documentation of Ace Meng.

## 2024-03-27 DIAGNOSIS — E74.31 SUCRASE-ISOMALTASE DEFICIENCY: ICD-10-CM

## 2024-03-27 RX ORDER — SACROSIDASE 8500 [IU]/ML
2 SOLUTION ORAL 4 TIMES DAILY
Qty: 360 ML | Refills: 11 | Status: SHIPPED | OUTPATIENT
Start: 2024-03-27

## 2024-03-27 NOTE — TELEPHONE ENCOUNTER
Rx Refill Note  Requested Prescriptions     Pending Prescriptions Disp Refills    Sacrosidase (Sucraid) 8500 UNIT/ML solution 360 mL 11     Sig: Take 2 mL by mouth 4 (Four) Times a Day. With meals and snacks      Last office visit with prescribing clinician: 2/20/2023   Last telemedicine visit with prescribing clinician: Visit date not found   Next office visit with prescribing clinician: Visit date not found                             Brisa Luu MA  03/27/24, 10:37 EDT

## 2024-06-11 ENCOUNTER — PRIOR AUTHORIZATION (OUTPATIENT)
Dept: GASTROENTEROLOGY | Facility: CLINIC | Age: 74
End: 2024-06-11
Payer: MEDICARE

## 2024-08-27 ENCOUNTER — TELEPHONE (OUTPATIENT)
Dept: GASTROENTEROLOGY | Facility: CLINIC | Age: 74
End: 2024-08-27
Payer: MEDICARE

## 2024-08-27 NOTE — TELEPHONE ENCOUNTER
Received fax from [facility] -     They have made 3 attempts to contact patient to schedule Sucraid Delivery.

## 2024-09-24 DIAGNOSIS — K86.89 PANCREATIC INSUFFICIENCY: ICD-10-CM

## 2024-09-24 RX ORDER — PANCRELIPASE 36000; 180000; 114000 [USP'U]/1; [USP'U]/1; [USP'U]/1
CAPSULE, DELAYED RELEASE PELLETS ORAL
Qty: 300 CAPSULE | Refills: 0 | Status: SHIPPED | OUTPATIENT
Start: 2024-09-24

## 2024-09-26 ENCOUNTER — TELEPHONE (OUTPATIENT)
Dept: GASTROENTEROLOGY | Facility: CLINIC | Age: 74
End: 2024-09-26
Payer: MEDICARE

## 2024-09-30 ENCOUNTER — TELEPHONE (OUTPATIENT)
Dept: GASTROENTEROLOGY | Facility: CLINIC | Age: 74
End: 2024-09-30
Payer: MEDICARE

## 2024-09-30 NOTE — TELEPHONE ENCOUNTER
RECEIVED FAX FROM [employer].     THEY HAVE MADE 3 ATTEMPTS TO CONTACT PATIENT TO SCHEDULE MEDICATION DELIVERY.

## 2024-10-28 ENCOUNTER — TELEPHONE (OUTPATIENT)
Dept: GASTROENTEROLOGY | Facility: CLINIC | Age: 74
End: 2024-10-28
Payer: MEDICARE

## 2024-10-28 NOTE — TELEPHONE ENCOUNTER
PATIENT'S DAUGHTER CALLED ABOUT POSSIBLY WANTING TO TRANSFER HER FATHER'S CARE FROM THE 1720 OFFICE TO THE 1780 OFFICE. TRANSFERRED TO [facility].

## 2024-11-09 DIAGNOSIS — K86.89 PANCREATIC INSUFFICIENCY: ICD-10-CM

## 2024-11-11 RX ORDER — PANCRELIPASE 36000; 180000; 114000 [USP'U]/1; [USP'U]/1; [USP'U]/1
CAPSULE, DELAYED RELEASE PELLETS ORAL
Qty: 300 CAPSULE | Refills: 0 | Status: SHIPPED | OUTPATIENT
Start: 2024-11-11 | End: 2024-11-15 | Stop reason: SDUPTHER

## 2024-11-11 NOTE — TELEPHONE ENCOUNTER
Rx Refill Note  Requested Prescriptions     Pending Prescriptions Disp Refills    Creon [id number] units capsule delayed-release particles capsule [Pharmacy Med Name: Creon 18652 UNIT Oral Capsule Delayed Release Particles] 300 capsule 0     Sig: TAKE 2 CAPSULES BY MOUTH THREE TIMES DAILY WITH MEALS AND 1 CAPSULE WITH SNACKS -MAX OF 7 CAPSULES PER DAY-      Last office visit with prescribing clinician: 2/20/2023   Last telemedicine visit with prescribing clinician: Visit date not found   Next office visit with prescribing clinician: 11/15/2024                         Would you like a call back once the refill request has been completed: [] Yes [] No    If the office needs to give you a call back, can they leave a voicemail: [] Yes [] No    СЕРГЕЙ Pablo  11/11/24, 09:32 EST

## 2024-11-14 NOTE — PROGRESS NOTES
Follow Up      Patient Name: Ace Meng  : 1950   MRN: [id number]     Chief Complaint:    Chief Complaint   Patient presents with    pancreatic insufficiancy       History of Present Illness: Ace Meng is a 74 y.o. male who is here today for follow up on diarrhea      Having a bm about 2-3 times a day- occasionally diarrhea or formed.  No abdominal pain or bloating.  Much improved on Creon and Sucraid.  Having a lot of gas and occasional fecal incontinence (especially if his bladder is full).  He has fear of going out because of foul-smelling gas and occasional incontinence.  His A1C was around 6%.    He has seen a nutritionist.      Pancreatic fecal elastase low at 133  Sucraid breath test abnormal at 2.7%.      For his chronic diarrhea: He has tried Xifaxan without benefit.  Amitriptyline was initially helpful but lost effect.  Low FODMAP diet was slightly helpful. Colestipol not helpful.   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 Trial of budesonide not helpful.      CT Abdomen Pelvis With & Without Contrast (10/25/2022 15:04) IMPRESSION:  1. No acute abnormality in the abdomen or pelvis.  2. Colonic diverticulosis without diverticulitis most pronounced in sigmoid colon.  3. Nonobstructing 3 mm left lower pole renal calculus.  4. Additional chronic findings above.     ENDOSCOPY, INT (2022)-LA grade A esophagitis, multiple fundic gland polyps.  Biopsies negative for celiac     SCANNED - COLONOSCOPY (2021)-internal hemorrhoids, diverticulosis tubular adenoma in the ascending colon, random biopsies normal     EGD in 2016- normal.      Subjective      Review of Systems:   Review of Systems   Constitutional:  Negative for activity change, appetite change, chills, diaphoresis, fatigue, fever, unexpected weight gain and unexpected weight loss.   HENT:  Negative for trouble swallowing.    Gastrointestinal:  Positive for abdominal pain, constipation and  diarrhea. Negative for abdominal distention, anal bleeding, blood in stool, nausea, rectal pain, vomiting, GERD and indigestion.       Medications:     Current Outpatient Medications:     amLODIPine (NORVASC) 5 MG tablet, Take 1 tablet by mouth Daily. (Patient taking differently: Take 2 tablets by mouth Daily.), Disp: 30 tablet, Rfl: 12    atorvastatin (LIPITOR) 20 MG tablet, Take 1 tablet by mouth Daily., Disp: , Rfl:     carvedilol (COREG) 12.5 MG tablet, Take 1 tablet by mouth 2 (Two) Times a Day With Meals. (Patient taking differently: Take 0.5 tablets by mouth 2 (Two) Times a Day With Meals.), Disp: 60 tablet, Rfl: 12    coenzyme Q10 100 MG capsule, Take 1 capsule by mouth Daily., Disp: , Rfl:     glimepiride (AMARYL) 1 MG tablet, Take 1 tablet by mouth Every Morning Before Breakfast., Disp: , Rfl:     glucose blood (FREESTYLE LITE) test strip, 1 each by Other route 2 (Two) Times a Week. Use as instructed, Disp: 100 each, Rfl: 0    Jardiance 10 MG tablet tablet, Take 1 tablet by mouth Daily., Disp: , Rfl:     lisinopril-hydrochlorothiazide (PRINZIDE,ZESTORETIC) 20-12.5 MG per tablet, TAKE ONE TABLET BY MOUTH TWICE A DAY, Disp: 180 tablet, Rfl: 3    Omega-3 Fatty Acids (Omega-3 2100) 1050 MG capsule, Take  by mouth., Disp: , Rfl:     Pancrelipase, Lip-Prot-Amyl, (Creon) 87753-208959 units capsule delayed-release particles capsule, Take 3 capsules by mouth 3 (Three) Times a Day With Meals. And 1 cap with snacks, Disp: 400 capsule, Rfl: 11    Sacrosidase (Sucraid) 8500 UNIT/ML solution, Take 2 mL by mouth 4 (Four) Times a Day. With meals and snacks, Disp: 360 mL, Rfl: 11    Allergies:   No Known Allergies    Social History:   Social History     Socioeconomic History    Marital status:    Tobacco Use    Smoking status: Never    Smokeless tobacco: Never   Vaping Use    Vaping status: Never Used   Substance and Sexual Activity    Alcohol use: Not Currently     Comment: Very infrequently    Drug use: Never     "    Surgical History:   Past Surgical History:   Procedure Laterality Date    COLONOSCOPY  07/2016    Dx with colitis        Medical History:   Past Medical History:   Diagnosis Date    Abdominal pain, generalized     Acute coronary syndrome     Allergic rhinitis     Chronic low back pain     Colitis     Diabetes mellitus     Diarrhea     Family history of colon cancer     H/O colonoscopy 04/05/2016    Microscopic colitis    Hyperlipidemia     Hypertension     Microscopic colitis         Objective     Physical Exam:  Vital Signs:   Vitals:    11/15/24 1035   BP: 132/78   BP Location: Left arm   Patient Position: Sitting   Cuff Size: Adult   Pulse: 64   Temp: 98.1 °F (36.7 °C)   SpO2: 97%   Weight: 107 kg (235 lb 3.2 oz)   Height: 182.9 cm (72\")     Body mass index is 31.9 kg/m².     Physical Exam  Constitutional:       General: He is not in acute distress.     Appearance: He is well-developed.   Pulmonary:      Effort: Pulmonary effort is normal. No accessory muscle usage or respiratory distress.   Abdominal:      General: Bowel sounds are normal. There is no distension.      Palpations: Abdomen is soft.      Tenderness: There is no abdominal tenderness.   Skin:     Coloration: Skin is not pale.      Findings: No erythema.   Neurological:      Mental Status: He is alert and oriented to person, place, and time.   Psychiatric:         Speech: Speech normal.         Behavior: Behavior normal.         Thought Content: Thought content normal.         Judgment: Judgment normal.         Assessment / Plan      Assessment/Plan:   Diagnoses and all orders for this visit:    1. Pancreatic insufficiency (Primary)  -     Pancrelipase, Lip-Prot-Amyl, (Creon) 61534-825063 units capsule delayed-release particles capsule; Take 3 capsules by mouth 3 (Three) Times a Day With Meals. And 1 cap with snacks  Dispense: 400 capsule; Refill: 11  Discussed adjusting the timing of this medication and taking at onset of eating rather than mid " "meal.  He may take a third capsule mid meal with longer meals.  2. Sucrase-isomaltase deficiency  -     Sacrosidase (Sucraid) 8500 UNIT/ML solution; Take 2 mL by mouth 4 (Four) Times a Day. With meals and snacks  Dispense: 360 mL; Refill: 11    3. Incontinence of feces with fecal urgency  Discussed pelvic floor physical therapy which she declines.  Discussed pelvic floor exercises  We also discussed taking Imodium before \"going out\".       Follow Up:   Return in about 1 year (around 11/15/2025), or if symptoms worsen or fail to improve.    Plan of care reviewed with the patient at the conclusion of today's visit.  Education was provided regarding diagnosis, management, and any prescribed or recommended OTC medications.  Patient verbalized understanding of and agreement with management plan.         ALEJANDRINA Ibrahim  [facility]  "

## 2024-11-15 ENCOUNTER — OFFICE VISIT (OUTPATIENT)
Dept: GASTROENTEROLOGY | Facility: CLINIC | Age: 74
End: 2024-11-15
Payer: MEDICARE

## 2024-11-15 VITALS
HEART RATE: 64 BPM | TEMPERATURE: 98.1 F | DIASTOLIC BLOOD PRESSURE: 78 MMHG | HEIGHT: 72 IN | OXYGEN SATURATION: 97 % | SYSTOLIC BLOOD PRESSURE: 132 MMHG | BODY MASS INDEX: 31.86 KG/M2 | WEIGHT: 235.2 LBS

## 2024-11-15 DIAGNOSIS — R15.9 INCONTINENCE OF FECES WITH FECAL URGENCY: ICD-10-CM

## 2024-11-15 DIAGNOSIS — K86.89 PANCREATIC INSUFFICIENCY: Primary | ICD-10-CM

## 2024-11-15 DIAGNOSIS — R15.2 INCONTINENCE OF FECES WITH FECAL URGENCY: ICD-10-CM

## 2024-11-15 DIAGNOSIS — E74.31 SUCRASE-ISOMALTASE DEFICIENCY: ICD-10-CM

## 2024-11-15 RX ORDER — EMPAGLIFLOZIN 10 MG/1
1 TABLET, FILM COATED ORAL DAILY
COMMUNITY
Start: 2024-10-29

## 2024-11-15 RX ORDER — SACROSIDASE 8500 [IU]/ML
2 SOLUTION ORAL 4 TIMES DAILY
Qty: 360 ML | Refills: 11 | Status: SHIPPED | OUTPATIENT
Start: 2024-11-15

## 2024-12-24 DIAGNOSIS — K86.89 PANCREATIC INSUFFICIENCY: ICD-10-CM

## 2024-12-26 NOTE — TELEPHONE ENCOUNTER
Rx Refill Note  Pending Prescriptions:                       Disp   Refills    Creon 99496-553625 units capsule delayed-r*300 ca*0        Sig: TAKE 2 CAPSULES BY MOUTH THREE TIMES DAILY WITH MEALS           AND 1 WITH SNACKS. MAX OF 7 CAPSULES PER DAY    Last office visit with prescribing clinician: 11/15/2024   Last telemedicine visit with prescribing clinician: Visit date not found   Next office visit with prescribing clinician: Visit date not found         Brisa Luu MA  12/26/24, 07:50 EST

## 2024-12-27 ENCOUNTER — TELEPHONE (OUTPATIENT)
Dept: GASTROENTEROLOGY | Facility: CLINIC | Age: 74
End: 2024-12-27
Payer: MEDICARE

## 2024-12-27 RX ORDER — PANCRELIPASE 36000; 180000; 114000 [USP'U]/1; [USP'U]/1; [USP'U]/1
CAPSULE, DELAYED RELEASE PELLETS ORAL
Qty: 300 CAPSULE | Refills: 3 | Status: SHIPPED | OUTPATIENT
Start: 2024-12-27

## 2024-12-27 NOTE — TELEPHONE ENCOUNTER
I spoke with Mrs Meng. There's no alternative for Sucraid. The best advice is to avoid Sucrose per Dr Jacome.

## 2024-12-27 NOTE — TELEPHONE ENCOUNTER
Hub staff attempted to follow warm transfer process and was unsuccessful     Caller: Luan Meng    Relationship to patient: Emergency Contact    Best call back number: [phone]    Patient is needing: PATIENT'S WIFE, LUAN IS REQUESTING TO SPEAK WITH STEVE JESSICA IN REGARD TO PATIENT'S SUCRATE MEDICATION AND AN ALTERNATIVE. PLEASE CALL BACK ANYTIME, OKAY TO LEAVE VM.

## 2025-01-13 ENCOUNTER — TELEPHONE (OUTPATIENT)
Dept: GASTROENTEROLOGY | Facility: CLINIC | Age: 75
End: 2025-01-13
Payer: MEDICARE

## 2025-01-13 DIAGNOSIS — K86.89 PANCREATIC INSUFFICIENCY: ICD-10-CM

## 2025-01-13 NOTE — TELEPHONE ENCOUNTER
Caller: Clarissa Meng    Relationship: Emergency Contact    Best call back number:  [phone]    Requested Prescriptions:   Requested Prescriptions     Pending Prescriptions Disp Refills    Pancrelipase, Lip-Prot-Amyl, (Creon) 15151-206690 units capsule delayed-release particles capsule 300 capsule 3        Pharmacy where request should be sent:    [facility] [address] [place] [address] - [address]  - [phone]  - [phone] FX   [address] [facility] [address]   Phone: [phone] Fax: [phone]       Last office visit with prescribing clinician: Visit date not found   Last telemedicine visit with prescribing clinician: Visit date not found   Next office visit with prescribing clinician: Visit date not found     Additional details provided by patient: PT IS NEEDING A REFILL ON CREON    Does the patient have less than a 3 day supply:  [x] Yes  [] No    Would you like a call back once the refill request has been completed: [x] Yes [] No    If the office needs to give you a call back, can they leave a voicemail: [x] Yes [] No    Shraddha Archer Rep   01/13/25 11:35 EST

## 2025-01-30 ENCOUNTER — TELEPHONE (OUTPATIENT)
Dept: GASTROENTEROLOGY | Facility: CLINIC | Age: 75
End: 2025-01-30

## 2025-01-30 NOTE — TELEPHONE ENCOUNTER
Hub staff attempted to follow warm transfer process and was unsuccessful     Caller: Luan Meng    Relationship to patient: Emergency Contact    Best call back number: [phone]    Patient is needing: PATIENT'S WIFE, LUAN CALLED IN AND STATED THAT SHE WAS TOLD BY [facility] THAT PATIENT'S INSURANCE WILL NO LONGER COVER SUCRAID MEDICATION. PATIENT WAS TOLD TO CONTACT OFFICE AND HAVE PROVIDER REQUEST AN APPEAL FOR THE MEDICATION. PLEASE CALL BACK TO ADVISE. OKAY TO CALL ANYTIME, OKAY TO LEAVE .

## 2025-01-31 NOTE — TELEPHONE ENCOUNTER
Hub staff attempted to follow warm transfer process and was unsuccessful     Caller: Clarissa Meng    Relationship to patient: Emergency Contact    Best call back number: [phone]    Patient is needing: PT'S SPOUSE CALLING TO CHECK STATUS OF THIS REQUEST

## 2025-02-03 ENCOUNTER — TELEPHONE (OUTPATIENT)
Dept: GASTROENTEROLOGY | Facility: CLINIC | Age: 75
End: 2025-02-03

## 2025-02-03 NOTE — TELEPHONE ENCOUNTER
Caller: Ace Meng    Relationship: Self    Best call back number: [phone]    What is the best time to reach you: ANYTIME     Who are you requesting to speak with (clinical staff, provider,  specific staff member): CLINICAL STAFF         What was the call regarding: PT IS CALLING IN FOR A UPDATE ON THE APPEAL FOR THE MEDICATION SUCRAID. PTS INSURANCE COMPANY IS REQUESTING A APPEAL TO PAY THE MEDICATION.

## 2025-03-20 ENCOUNTER — PRIOR AUTHORIZATION (OUTPATIENT)
Dept: GASTROENTEROLOGY | Facility: CLINIC | Age: 75
End: 2025-03-20
Payer: MEDICARE

## 2025-03-20 NOTE — TELEPHONE ENCOUNTER
(Key: [id number])  PA Case ID #: [id number]  Need Help? Call us at [phone]  Status  sent iconSent to Plan today  Drug  Sucraid 8500UNIT/ML solution  ePA cloud logo  Form  [employer] PA Form